# Patient Record
Sex: FEMALE | Race: WHITE | Employment: FULL TIME | URBAN - NONMETROPOLITAN AREA
[De-identification: names, ages, dates, MRNs, and addresses within clinical notes are randomized per-mention and may not be internally consistent; named-entity substitution may affect disease eponyms.]

---

## 2017-01-06 ENCOUNTER — TELEPHONE (OUTPATIENT)
Dept: FAMILY MEDICINE CLINIC | Facility: CLINIC | Age: 57
End: 2017-01-06

## 2017-01-06 NOTE — TELEPHONE ENCOUNTER
[id number]--  Left Message - schedule appt with Dr Lala Nava for EKG and BMP; is past due for CMP     By Africa Garner RN

## 2017-01-09 ENCOUNTER — OFFICE VISIT (OUTPATIENT)
Dept: FAMILY MEDICINE CLINIC | Facility: CLINIC | Age: 57
End: 2017-01-09

## 2017-01-09 VITALS
BODY MASS INDEX: 25 KG/M2 | TEMPERATURE: 97 F | DIASTOLIC BLOOD PRESSURE: 70 MMHG | WEIGHT: 162.5 LBS | SYSTOLIC BLOOD PRESSURE: 138 MMHG

## 2017-01-09 DIAGNOSIS — Z01.818 PRE-OP EVALUATION: Primary | ICD-10-CM

## 2017-01-09 DIAGNOSIS — E78.00 HIGH CHOLESTEROL: ICD-10-CM

## 2017-01-09 DIAGNOSIS — I10 ESSENTIAL HYPERTENSION: ICD-10-CM

## 2017-01-09 LAB
ALBUMIN SERPL-MCNC: 3.8 G/DL (ref 3.5–4.8)
ALP LIVER SERPL-CCNC: 74 U/L (ref 46–118)
ALT SERPL-CCNC: 21 U/L (ref 14–54)
AST SERPL-CCNC: 18 U/L (ref 15–41)
BILIRUB SERPL-MCNC: 0.5 MG/DL (ref 0.1–2)
BUN BLD-MCNC: 14 MG/DL (ref 8–20)
CALCIUM BLD-MCNC: 8.6 MG/DL (ref 8.3–10.3)
CHLORIDE: 105 MMOL/L (ref 101–111)
CHOLEST SMN-MCNC: 212 MG/DL (ref ?–200)
CO2: 25 MMOL/L (ref 22–32)
CREAT BLD-MCNC: 0.59 MG/DL (ref 0.55–1.02)
GLUCOSE BLD-MCNC: 104 MG/DL (ref 70–99)
HDLC SERPL-MCNC: 44 MG/DL (ref 45–?)
HDLC SERPL: 4.82 {RATIO} (ref ?–4.44)
LDLC SERPL CALC-MCNC: 144 MG/DL (ref ?–130)
M PROTEIN MFR SERPL ELPH: 7 G/DL (ref 6.1–8.3)
NONHDLC SERPL-MCNC: 168 MG/DL (ref ?–130)
POTASSIUM SERPL-SCNC: 3.5 MMOL/L (ref 3.6–5.1)
SODIUM SERPL-SCNC: 138 MMOL/L (ref 136–144)
TRIGLYCERIDES: 121 MG/DL (ref ?–150)
VLDL: 24 MG/DL (ref 5–40)

## 2017-01-09 PROCEDURE — 80053 COMPREHEN METABOLIC PANEL: CPT | Performed by: FAMILY MEDICINE

## 2017-01-09 PROCEDURE — 99214 OFFICE O/P EST MOD 30 MIN: CPT | Performed by: FAMILY MEDICINE

## 2017-01-09 PROCEDURE — 36415 COLL VENOUS BLD VENIPUNCTURE: CPT | Performed by: FAMILY MEDICINE

## 2017-01-09 PROCEDURE — 80061 LIPID PANEL: CPT | Performed by: FAMILY MEDICINE

## 2017-01-09 PROCEDURE — 93000 ELECTROCARDIOGRAM COMPLETE: CPT | Performed by: FAMILY MEDICINE

## 2017-01-09 NOTE — PROGRESS NOTES
Carlito Burrows is a 64year old female. Patient presents with:  HTN: re check on B/P... Slater August Slater August Slater August  8      HPI:   Pre-op  BP stable w/o problems w/ meds  + diet / wt loss     Current Outpatient Prescriptions on File Prior to Visit:  TURMERIC OR Take 1 capsule by no rashes,no suspicious lesions  HEENT: atraumatic, normocephalic, R TM normal, L TM normal, Pharynx normal  NECK: supple, no cervical adenopathy  LUNGS: clear to auscultation  CARDIO: RRR without murmur  ABD soft, nontender, normal BS, no masses, rebound

## 2017-01-13 ENCOUNTER — TELEPHONE (OUTPATIENT)
Dept: FAMILY MEDICINE CLINIC | Facility: CLINIC | Age: 57
End: 2017-01-13

## 2017-01-13 RX ORDER — LEVOTHYROXINE SODIUM 0.07 MG/1
TABLET ORAL
Qty: 90 TABLET | Refills: 0 | OUTPATIENT
Start: 2017-01-13

## 2017-01-13 NOTE — TELEPHONE ENCOUNTER
CPT: [id number], [id number]  DX: K40.90    I called [employer] and used the automated system. No auth required.  Conf # [id number]. Matthew Raymundo

## 2017-01-18 ENCOUNTER — OFFICE VISIT (OUTPATIENT)
Dept: FAMILY MEDICINE CLINIC | Facility: CLINIC | Age: 57
End: 2017-01-18

## 2017-01-18 VITALS
SYSTOLIC BLOOD PRESSURE: 126 MMHG | WEIGHT: 162 LBS | HEIGHT: 66 IN | DIASTOLIC BLOOD PRESSURE: 66 MMHG | TEMPERATURE: 98 F | BODY MASS INDEX: 26.03 KG/M2

## 2017-01-18 DIAGNOSIS — J00 ACUTE NASOPHARYNGITIS: Primary | ICD-10-CM

## 2017-01-18 PROCEDURE — 99213 OFFICE O/P EST LOW 20 MIN: CPT | Performed by: FAMILY MEDICINE

## 2017-01-18 NOTE — PATIENT INSTRUCTIONS
REST,FLUIDS,ADVIL / TYLENOL PRN fever / body aches  CALL NO CHANGE WORSENING  DISCUSSED WARNING SIGNS  F/U No change 2-3 days  Marysue Apley / Christin Fleming / Lorenz Aschoff

## 2017-01-18 NOTE — PROGRESS NOTES
HPI:    Patient ID: Celeste Choi is a 64year old female. X 1 day  + head sharmin  occas cough  W/o ST  HPI    Review of Systems           Current Outpatient Prescriptions:  TURMERIC OR Take 1 capsule by mouth daily.  Disp:  Rfl:    BYSTOLIC 10 MG Oral Tab

## 2017-01-19 ENCOUNTER — ANESTHESIA EVENT (OUTPATIENT)
Dept: SURGERY | Facility: HOSPITAL | Age: 57
End: 2017-01-19
Payer: COMMERCIAL

## 2017-01-20 ENCOUNTER — SURGERY (OUTPATIENT)
Age: 57
End: 2017-01-20

## 2017-01-20 ENCOUNTER — ANESTHESIA (OUTPATIENT)
Dept: SURGERY | Facility: HOSPITAL | Age: 57
End: 2017-01-20
Payer: COMMERCIAL

## 2017-01-20 NOTE — ANESTHESIA PREPROCEDURE EVALUATION
PRE-OP EVALUATION    Patient Name: Ilda Vaz    Pre-op Diagnosis: Inguinal hernia without mention of obstruction or gangrene, unilateral or unspecified, (not specified as recurrent) [K40.90]    Procedure(s):  LAPAROSCOPIC LEFT INGUINAL HERNIA REPAIR W Lab Results  Component Value Date    01/09/2017   K 3.5* 01/09/2017    01/09/2017   CO2 25.0 01/09/2017   BUN 14 01/09/2017   CREATSERUM 0.59 01/09/2017   * 01/09/2017   CA 8.6 01/09/2017            Airway      Mallampati: I  Mouth o

## 2017-01-20 NOTE — ANESTHESIA POSTPROCEDURE EVALUATION
[id number] [facility] Patient Status:  Hospital Outpatient Surgery   Age/Gender 64year old female MRN [id number]   Location [address] Attending Stephanie Essex, DO   Hosp Day # 0 PCP Abigail Valenzuela DO       Anesthes

## 2017-01-25 ENCOUNTER — TELEPHONE (OUTPATIENT)
Dept: FAMILY MEDICINE CLINIC | Facility: CLINIC | Age: 57
End: 2017-01-25

## 2017-01-25 RX ORDER — ONDANSETRON 4 MG/1
4 TABLET, ORALLY DISINTEGRATING ORAL EVERY 8 HOURS PRN
Qty: 8 TABLET | Refills: 0 | Status: SHIPPED | OUTPATIENT
Start: 2017-01-25 | End: 2017-01-27

## 2017-01-25 NOTE — TELEPHONE ENCOUNTER
Pt is 5 days s/p inguinal hernia repair. Pt states since surgery she has been nauseated and dizzy. Today she threw up x2. Pt denies fever or diarrhea. She stopped taking the Norco 2 days ago. She is using Ibuprofen 3 po every 4-6 hrs prn for pain.  Pt aware

## 2017-01-27 ENCOUNTER — OFFICE VISIT (OUTPATIENT)
Dept: FAMILY MEDICINE CLINIC | Facility: CLINIC | Age: 57
End: 2017-01-27

## 2017-01-27 VITALS — SYSTOLIC BLOOD PRESSURE: 110 MMHG | TEMPERATURE: 98 F | DIASTOLIC BLOOD PRESSURE: 70 MMHG | HEIGHT: 66 IN

## 2017-01-27 DIAGNOSIS — K40.90 UNILATERAL INGUINAL HERNIA WITHOUT OBSTRUCTION OR GANGRENE, RECURRENCE NOT SPECIFIED: Primary | ICD-10-CM

## 2017-01-27 DIAGNOSIS — I10 ESSENTIAL HYPERTENSION: Primary | ICD-10-CM

## 2017-01-27 PROCEDURE — 99024 POSTOP FOLLOW-UP VISIT: CPT | Performed by: SURGERY

## 2017-01-27 RX ORDER — NEBIVOLOL HYDROCHLORIDE 10 MG/1
TABLET ORAL
Qty: 30 TABLET | Refills: 2 | Status: SHIPPED | OUTPATIENT
Start: 2017-01-27 | End: 2017-04-20

## 2017-01-27 NOTE — PROGRESS NOTES
1/27/2017    Patient presents with:  Post-Op: s/p LT inguinal hernia repair on 1/20/17. ....denies pain and discharge from incision sites. ..no fevers, but stomach has been bothering pt--nausea. ...room [address] [facility] Terrell Sabina is status post a laparoscopic repa

## 2017-02-08 ENCOUNTER — TELEPHONE (OUTPATIENT)
Dept: FAMILY MEDICINE CLINIC | Facility: CLINIC | Age: 57
End: 2017-02-08

## 2017-02-08 NOTE — TELEPHONE ENCOUNTER
Pt was planning on going back to work on 2/27/17. Pt states her 6 weeks would be up on 3/3/17 and would need a note with the restrictions-no lifting, pulling or pushing greater than 5 lbs and no standing for prolonged time until 3/3/17.  faxed to her HR dep

## 2017-02-14 ENCOUNTER — TELEPHONE (OUTPATIENT)
Dept: FAMILY MEDICINE CLINIC | Facility: CLINIC | Age: 57
End: 2017-02-14

## 2017-02-14 NOTE — TELEPHONE ENCOUNTER
Pt calls in to let me know she will RTW on 2/27/16. Pt will needs restrictions x1 week. Pt aware I will fax note to [phone]. Note faxed.  marta

## 2017-02-24 RX ORDER — LISINOPRIL AND HYDROCHLOROTHIAZIDE 12.5; 1 MG/1; MG/1
TABLET ORAL
Qty: 90 TABLET | Refills: 0 | Status: SHIPPED | OUTPATIENT
Start: 2017-02-24 | End: 2017-10-17 | Stop reason: ALTCHOICE

## 2017-04-20 RX ORDER — NEBIVOLOL HYDROCHLORIDE 10 MG/1
TABLET ORAL
Qty: 30 TABLET | Refills: 0 | Status: SHIPPED | OUTPATIENT
Start: 2017-04-20 | End: 2017-05-19

## 2017-05-19 ENCOUNTER — TELEPHONE (OUTPATIENT)
Dept: FAMILY MEDICINE CLINIC | Facility: CLINIC | Age: 57
End: 2017-05-19

## 2017-05-19 RX ORDER — NEBIVOLOL HYDROCHLORIDE 10 MG/1
TABLET ORAL
Qty: 30 TABLET | Refills: 0 | Status: SHIPPED | OUTPATIENT
Start: 2017-05-19 | End: 2017-06-16

## 2017-05-19 RX ORDER — LISINOPRIL AND HYDROCHLOROTHIAZIDE 12.5; 1 MG/1; MG/1
TABLET ORAL
Qty: 90 TABLET | Refills: 0 | Status: SHIPPED | OUTPATIENT
Start: 2017-05-19 | End: 2017-10-17 | Stop reason: ALTCHOICE

## 2017-05-19 NOTE — TELEPHONE ENCOUNTER
Last OV: 1/18/2017  Last labs: 4/6/5703  Bystolic: 8/37/2023 #26 no RF  Lisinopril hctz: 2/24/2017 #90 no RF

## 2017-05-19 NOTE — TELEPHONE ENCOUNTER
pt has allergies and been taking allegra d but still coughing all night long, she does not have ins until july, wants to talk with the nurse. Since Sunday started out sneezing, PND, Head congestion; has been taking allegraD.   Last night coughing kept he

## 2017-05-22 ENCOUNTER — OFFICE VISIT (OUTPATIENT)
Dept: FAMILY MEDICINE CLINIC | Facility: CLINIC | Age: 57
End: 2017-05-22

## 2017-05-22 VITALS
DIASTOLIC BLOOD PRESSURE: 84 MMHG | HEIGHT: 66 IN | WEIGHT: 161 LBS | BODY MASS INDEX: 25.88 KG/M2 | TEMPERATURE: 98 F | SYSTOLIC BLOOD PRESSURE: 116 MMHG

## 2017-05-22 DIAGNOSIS — J45.21 REACTIVE AIRWAY DISEASE, MILD INTERMITTENT, WITH ACUTE EXACERBATION: ICD-10-CM

## 2017-05-22 DIAGNOSIS — J40 BRONCHITIS: Primary | ICD-10-CM

## 2017-05-22 PROCEDURE — 99213 OFFICE O/P EST LOW 20 MIN: CPT | Performed by: FAMILY MEDICINE

## 2017-05-22 RX ORDER — AZITHROMYCIN 250 MG/1
TABLET, FILM COATED ORAL
Qty: 6 TABLET | Refills: 0 | Status: SHIPPED | OUTPATIENT
Start: 2017-05-22 | End: 2017-10-17 | Stop reason: ALTCHOICE

## 2017-05-22 RX ORDER — PREDNISONE 20 MG/1
20 TABLET ORAL 2 TIMES DAILY
Qty: 10 TABLET | Refills: 0 | Status: SHIPPED | OUTPATIENT
Start: 2017-05-22 | End: 2017-05-29

## 2017-05-22 NOTE — PROGRESS NOTES
HPI:    Patient ID: Vinay Harris is a 62year old female. X 1 wk  Cough  Head sharmin  HPI    Review of Systems   Constitutional: Positive for chills and fatigue. Negative for fever. HENT: Positive for congestion and rhinorrhea.     Respiratory: Positive exhibits no edema. Vitals reviewed.     /84 mmHg  Temp(Src) 97.8 °F (36.6 °C) (Temporal)  Ht 66\"  Wt 161 lb  BMI 26.00 kg/m2           ASSESSMENT/PLAN:   Bronchitis  (primary encounter diagnosis)  Reactive airway disease, mild intermittent, with ac

## 2017-06-16 RX ORDER — NEBIVOLOL HYDROCHLORIDE 10 MG/1
TABLET ORAL
Qty: 30 TABLET | Refills: 1 | Status: SHIPPED | OUTPATIENT
Start: 2017-06-16 | End: 2017-08-13

## 2017-07-20 ENCOUNTER — TELEPHONE (OUTPATIENT)
Dept: FAMILY MEDICINE CLINIC | Facility: CLINIC | Age: 57
End: 2017-07-20

## 2017-07-20 DIAGNOSIS — I83.93 VARICOSE VEINS OF LEGS: Primary | ICD-10-CM

## 2017-07-20 NOTE — TELEPHONE ENCOUNTER
Pt has bc hmo ins, she needs a referral sent to Dr Norris July for her veins, she is faxing a copy of her ins card.

## 2017-07-20 NOTE — TELEPHONE ENCOUNTER
Pt states she is currently in treatment for bilat varicose veins with [address] [place] [address] and she needs a referral to see Dr. Guerrero Talley.  Advised that Dr. Guerrero Talley is not in the the [facility] network for being treatment and pt would need to see a physician within

## 2017-08-14 RX ORDER — LISINOPRIL AND HYDROCHLOROTHIAZIDE 12.5; 1 MG/1; MG/1
TABLET ORAL
Qty: 90 TABLET | Refills: 0 | Status: SHIPPED | OUTPATIENT
Start: 2017-08-14 | End: 2018-07-26

## 2017-08-14 RX ORDER — NEBIVOLOL HYDROCHLORIDE 10 MG/1
TABLET ORAL
Qty: 30 TABLET | Refills: 0 | Status: SHIPPED | OUTPATIENT
Start: 2017-08-14 | End: 2017-09-10

## 2017-09-11 RX ORDER — NEBIVOLOL HYDROCHLORIDE 10 MG/1
TABLET ORAL
Qty: 30 TABLET | Refills: 0 | Status: SHIPPED | OUTPATIENT
Start: 2017-09-11 | End: 2017-10-30

## 2017-09-11 NOTE — TELEPHONE ENCOUNTER
Last ov:  5/22/17  Last cmp: 1/9/17   Last bp:  5/22/17    116/84   Last refill:  8/14/17   #30 with no refills      No future appointments.

## 2017-09-19 NOTE — PATIENT INSTRUCTIONS
REST,FLUIDS,ADVIL / TYLENOL PRN fever / body aches  CALL NO CHANGE WORSENING  DISCUSSED WARNING SIGNS  F/U No change 2-3 days
No significant past surgical history

## 2017-10-01 DIAGNOSIS — E03.9 HYPOTHYROIDISM, UNSPECIFIED TYPE: ICD-10-CM

## 2017-10-02 RX ORDER — LEVOTHYROXINE SODIUM 0.07 MG/1
TABLET ORAL
Qty: 90 TABLET | Refills: 0 | Status: SHIPPED | OUTPATIENT
Start: 2017-10-02 | End: 2017-10-20

## 2017-10-02 NOTE — TELEPHONE ENCOUNTER
Last office visit 5-22-17  Last refill 11-21-16 #90 with 2 refills. Last TSH 10-13-16. Due for TSH. Patient advised. Nurse appointment scheduled.   Future Appointments  Date Time Provider James Mejia   10/16/2017 4:00 PM [facility] NURSE JERRY DALEY

## 2017-10-14 ENCOUNTER — HOSPITAL ENCOUNTER (OUTPATIENT)
Dept: MAMMOGRAPHY | Age: 57
Discharge: HOME | End: 2017-10-14
Attending: OBSTETRICS & GYNECOLOGY
Payer: COMMERCIAL

## 2017-10-14 DIAGNOSIS — Z12.39 ENCOUNTER FOR SPECIAL SCREENING EXAMINATION FOR NEOPLASM OF BREAST: ICD-10-CM

## 2017-10-14 PROCEDURE — 77067 SCR MAMMO BI INCL CAD: CPT | Performed by: OBSTETRICS & GYNECOLOGY

## 2017-10-17 ENCOUNTER — OFFICE VISIT (OUTPATIENT)
Dept: FAMILY MEDICINE CLINIC | Facility: CLINIC | Age: 57
End: 2017-10-17

## 2017-10-17 ENCOUNTER — APPOINTMENT (OUTPATIENT)
Dept: LAB | Age: 57
End: 2017-10-17
Attending: FAMILY MEDICINE
Payer: COMMERCIAL

## 2017-10-17 VITALS
HEIGHT: 66 IN | OXYGEN SATURATION: 99 % | TEMPERATURE: 98 F | HEART RATE: 61 BPM | SYSTOLIC BLOOD PRESSURE: 120 MMHG | WEIGHT: 163.5 LBS | DIASTOLIC BLOOD PRESSURE: 80 MMHG | BODY MASS INDEX: 26.28 KG/M2

## 2017-10-17 DIAGNOSIS — E03.9 HYPOTHYROIDISM, UNSPECIFIED TYPE: ICD-10-CM

## 2017-10-17 DIAGNOSIS — H92.01 RIGHT EAR PAIN: Primary | ICD-10-CM

## 2017-10-17 DIAGNOSIS — M26.629 TMJ SYNDROME: ICD-10-CM

## 2017-10-17 DIAGNOSIS — I10 ESSENTIAL HYPERTENSION: ICD-10-CM

## 2017-10-17 PROCEDURE — 99213 OFFICE O/P EST LOW 20 MIN: CPT | Performed by: FAMILY MEDICINE

## 2017-10-17 PROCEDURE — 36415 COLL VENOUS BLD VENIPUNCTURE: CPT | Performed by: FAMILY MEDICINE

## 2017-10-17 PROCEDURE — 84443 ASSAY THYROID STIM HORMONE: CPT | Performed by: FAMILY MEDICINE

## 2017-10-17 RX ORDER — CYCLOBENZAPRINE HCL 10 MG
10 TABLET ORAL NIGHTLY
Qty: 30 TABLET | Refills: 0 | Status: SHIPPED | OUTPATIENT
Start: 2017-10-17 | End: 2017-11-06

## 2017-10-17 NOTE — PROGRESS NOTES
HPI:    Patient ID: Socorro De Jesus is a 62year old female. R ear pain / throbbing  HPI    Review of Systems   Constitutional: Positive for chills and fever. HENT: Positive for ear pain, rhinorrhea and sneezing. Respiratory: Negative for cough.

## 2017-10-20 DIAGNOSIS — E03.9 HYPOTHYROIDISM, UNSPECIFIED TYPE: ICD-10-CM

## 2017-10-20 RX ORDER — LEVOTHYROXINE SODIUM 0.07 MG/1
TABLET ORAL
Qty: 90 TABLET | Refills: 3 | Status: SHIPPED | OUTPATIENT
Start: 2017-10-20 | End: 2018-08-05

## 2017-10-30 RX ORDER — NEBIVOLOL HYDROCHLORIDE 10 MG/1
TABLET ORAL
Qty: 30 TABLET | Refills: 2 | Status: SHIPPED | OUTPATIENT
Start: 2017-10-30 | End: 2018-01-30

## 2017-10-30 RX ORDER — LISINOPRIL AND HYDROCHLOROTHIAZIDE 12.5; 1 MG/1; MG/1
TABLET ORAL
Qty: 90 TABLET | Refills: 0 | Status: SHIPPED | OUTPATIENT
Start: 2017-10-30 | End: 2017-11-29

## 2017-11-29 ENCOUNTER — OFFICE VISIT (OUTPATIENT)
Dept: FAMILY MEDICINE CLINIC | Facility: CLINIC | Age: 57
End: 2017-11-29

## 2017-11-29 ENCOUNTER — TELEPHONE (OUTPATIENT)
Dept: FAMILY MEDICINE CLINIC | Facility: CLINIC | Age: 57
End: 2017-11-29

## 2017-11-29 VITALS
HEART RATE: 72 BPM | SYSTOLIC BLOOD PRESSURE: 142 MMHG | OXYGEN SATURATION: 98 % | TEMPERATURE: 98 F | WEIGHT: 163.13 LBS | DIASTOLIC BLOOD PRESSURE: 90 MMHG | BODY MASS INDEX: 26 KG/M2

## 2017-11-29 DIAGNOSIS — B34.9 VIRAL SYNDROME: Primary | ICD-10-CM

## 2017-11-29 PROCEDURE — 99213 OFFICE O/P EST LOW 20 MIN: CPT | Performed by: FAMILY MEDICINE

## 2017-11-29 RX ORDER — PREDNISONE 20 MG/1
TABLET ORAL
Qty: 10 TABLET | Refills: 0 | Status: SHIPPED | OUTPATIENT
Start: 2017-11-29 | End: 2017-12-21 | Stop reason: ALTCHOICE

## 2017-11-29 RX ORDER — CODEINE PHOSPHATE AND GUAIFENESIN 10; 100 MG/5ML; MG/5ML
SOLUTION ORAL
Qty: 120 ML | Refills: 0 | Status: SHIPPED | OUTPATIENT
Start: 2017-11-29 | End: 2017-12-21 | Stop reason: ALTCHOICE

## 2017-11-29 NOTE — PROGRESS NOTES
Margareth Webb is a 62year old female. Patient presents with: Other: cough, congestion, headache-started on 11/26--has been taking dayquil and nyquil, mucinex. .room 1      HPI:   Patient complains of congestion, runny nose, sore throat, slight headache, REVIEW OF SYSTEMS:   GENERAL HEALTH: feels well otherwise  SKIN: denies any unusual skin lesions or rashes  RESPIRATORY: denies shortness of breath   CARDIOVASCULAR: denies chest pain   GI: denies nausea, vomiting, diarrhea or abdominal pain   NEURO: d

## 2017-11-29 NOTE — TELEPHONE ENCOUNTER
Pt states for the last 3-4 days she has had nasal congestion with a cough. She states the cough became worse last night. She states she has had fevers on and off.  Pt aware per Dr Rocha Gone she can see him tomorrow or make an appt with one of the other 's fo

## 2017-12-20 ENCOUNTER — TELEPHONE (OUTPATIENT)
Dept: FAMILY MEDICINE CLINIC | Facility: CLINIC | Age: 57
End: 2017-12-20

## 2017-12-20 NOTE — TELEPHONE ENCOUNTER
States, \"It came back or never went away. \"  Has been feeling worse again since Sunday. Symptoms started Saturday with generalized achiness and sore throat. Taking Mucinex and Sudafed PE. Cough, shortness of breath, sneezing, head congestion, headache.

## 2017-12-20 NOTE — TELEPHONE ENCOUNTER
See tomorrow. V/o Dr. Shara Raza. Patient advised. Appointment scheduled.   Future Appointments  Date Time Provider James Mejia   12/21/2017 11:30 AM Nina Kiser, DO [facility] [place]

## 2017-12-21 ENCOUNTER — OFFICE VISIT (OUTPATIENT)
Dept: FAMILY MEDICINE CLINIC | Facility: CLINIC | Age: 57
End: 2017-12-21

## 2017-12-21 VITALS
TEMPERATURE: 98 F | WEIGHT: 165.38 LBS | SYSTOLIC BLOOD PRESSURE: 110 MMHG | BODY MASS INDEX: 27 KG/M2 | DIASTOLIC BLOOD PRESSURE: 70 MMHG | HEART RATE: 58 BPM | OXYGEN SATURATION: 98 %

## 2017-12-21 DIAGNOSIS — J32.9 SINUSITIS, UNSPECIFIED CHRONICITY, UNSPECIFIED LOCATION: Primary | ICD-10-CM

## 2017-12-21 PROCEDURE — 99213 OFFICE O/P EST LOW 20 MIN: CPT | Performed by: FAMILY MEDICINE

## 2017-12-21 RX ORDER — PREDNISONE 20 MG/1
20 TABLET ORAL 2 TIMES DAILY
Qty: 10 TABLET | Refills: 0 | Status: SHIPPED | OUTPATIENT
Start: 2017-12-21 | End: 2017-12-28

## 2017-12-21 RX ORDER — DOXYCYCLINE 100 MG/1
100 CAPSULE ORAL 2 TIMES DAILY
Qty: 20 CAPSULE | Refills: 0 | Status: SHIPPED | OUTPATIENT
Start: 2017-12-21 | End: 2018-09-12 | Stop reason: ALTCHOICE

## 2017-12-21 NOTE — PROGRESS NOTES
HPI:    Patient ID: Gabriel Miner is a 62year old female. Cough / head sharmin x 6 days    HPI    Review of Systems   Constitutional: Positive for fever (? feverish). Gastrointestinal: Negative. Genitourinary: Negative.                Current Outpatien Visit:  Signed Prescriptions Disp Refills    predniSONE 20 MG Oral Tab 10 tablet 0      Sig: Take 1 tablet (20 mg total) by mouth 2 (two) times daily.       Doxycycline Monohydrate 100 MG Oral Cap 20 capsule 0      Sig: Take 1 capsule (100 mg total) by mout

## 2018-01-30 RX ORDER — LISINOPRIL AND HYDROCHLOROTHIAZIDE 12.5; 1 MG/1; MG/1
TABLET ORAL
Qty: 90 TABLET | Refills: 0 | Status: SHIPPED | OUTPATIENT
Start: 2018-01-30 | End: 2018-07-26

## 2018-01-30 RX ORDER — NEBIVOLOL HYDROCHLORIDE 10 MG/1
TABLET ORAL
Qty: 30 TABLET | Refills: 2 | Status: SHIPPED | OUTPATIENT
Start: 2018-01-30 | End: 2018-04-28

## 2018-04-28 RX ORDER — NEBIVOLOL HYDROCHLORIDE 10 MG/1
TABLET ORAL
Qty: 30 TABLET | Refills: 0 | Status: SHIPPED | OUTPATIENT
Start: 2018-04-28 | End: 2018-05-27

## 2018-04-28 RX ORDER — LISINOPRIL AND HYDROCHLOROTHIAZIDE 12.5; 1 MG/1; MG/1
TABLET ORAL
Qty: 90 TABLET | Refills: 0 | Status: SHIPPED | OUTPATIENT
Start: 2018-04-28 | End: 2019-03-04

## 2018-04-28 NOTE — TELEPHONE ENCOUNTER
Bystolic   Lisinopril-HCTZ    Last script 1/30/18  Last BP 12/21/17  Last CMP 1/9/17  Last OV 12/21/17    No future appointments.   Sending letter

## 2018-05-29 RX ORDER — NEBIVOLOL HYDROCHLORIDE 10 MG/1
TABLET ORAL
Qty: 30 TABLET | Refills: 0 | Status: SHIPPED | OUTPATIENT
Start: 2018-05-29 | End: 2018-06-28

## 2018-06-08 ENCOUNTER — TELEPHONE (OUTPATIENT)
Dept: FAMILY MEDICINE CLINIC | Facility: CLINIC | Age: 58
End: 2018-06-08

## 2018-06-08 ENCOUNTER — NURSE ONLY (OUTPATIENT)
Dept: FAMILY MEDICINE CLINIC | Facility: CLINIC | Age: 58
End: 2018-06-08

## 2018-06-08 DIAGNOSIS — Z23 NEED FOR VACCINATION: Primary | ICD-10-CM

## 2018-06-08 PROCEDURE — 90715 TDAP VACCINE 7 YRS/> IM: CPT | Performed by: FAMILY MEDICINE

## 2018-06-08 PROCEDURE — 90471 IMMUNIZATION ADMIN: CPT | Performed by: FAMILY MEDICINE

## 2018-06-08 NOTE — TELEPHONE ENCOUNTER
Monae's grandson has whooping cough, he is 3 mth's old and in the hospital, she would like to go see him but not not sure if she needs to take any precautions, call Daina Flores.

## 2018-06-08 NOTE — PROGRESS NOTES
PT VERY CONCERNED OVER GRANDSON IN [place] DIAGNOSED WITH WHOOPING COUGH. Tdap GIVEN LEFT DELTOID. ASKS FOR HANDOUT ON PERTUSSIS, GAVE HER INFO FROM [employer] SITE. DR Moreno Amezcua SPOKE TO HER BRIEFLY AS LEAVING OFFICE.

## 2018-06-29 RX ORDER — NEBIVOLOL HYDROCHLORIDE 10 MG/1
TABLET ORAL
Qty: 30 TABLET | Refills: 0 | Status: SHIPPED | OUTPATIENT
Start: 2018-06-29 | End: 2018-07-26

## 2018-07-27 RX ORDER — LISINOPRIL AND HYDROCHLOROTHIAZIDE 12.5; 1 MG/1; MG/1
TABLET ORAL
Qty: 90 TABLET | Refills: 0 | Status: SHIPPED | OUTPATIENT
Start: 2018-07-27 | End: 2018-09-12

## 2018-07-27 RX ORDER — NEBIVOLOL HYDROCHLORIDE 10 MG/1
TABLET ORAL
Qty: 30 TABLET | Refills: 0 | Status: SHIPPED | OUTPATIENT
Start: 2018-07-27 | End: 2018-09-01

## 2018-08-05 DIAGNOSIS — E03.9 HYPOTHYROIDISM, UNSPECIFIED TYPE: ICD-10-CM

## 2018-08-07 RX ORDER — LEVOTHYROXINE SODIUM 0.07 MG/1
TABLET ORAL
Qty: 90 TABLET | Refills: 0 | Status: SHIPPED | OUTPATIENT
Start: 2018-08-07 | End: 2019-03-23

## 2018-09-04 RX ORDER — NEBIVOLOL HYDROCHLORIDE 10 MG/1
TABLET ORAL
Qty: 30 TABLET | Refills: 0 | Status: SHIPPED | OUTPATIENT
Start: 2018-09-04 | End: 2018-09-29

## 2018-09-04 NOTE — TELEPHONE ENCOUNTER
Last office visit 12-21-17 110/70  Last refill 7-27-18 #30  Future Appointments  Date Time Provider James Mejia   9/12/2018 8:15 AM Rosendo Ritter DO [facility] [place]   9/12/2018 8:30 AM REF [facility] REF [facility] Ref Lab [facility]

## 2018-09-12 ENCOUNTER — APPOINTMENT (OUTPATIENT)
Dept: LAB | Age: 58
End: 2018-09-12
Attending: FAMILY MEDICINE
Payer: COMMERCIAL

## 2018-09-12 ENCOUNTER — OFFICE VISIT (OUTPATIENT)
Dept: FAMILY MEDICINE CLINIC | Facility: CLINIC | Age: 58
End: 2018-09-12
Payer: COMMERCIAL

## 2018-09-12 VITALS
HEIGHT: 66 IN | TEMPERATURE: 97 F | DIASTOLIC BLOOD PRESSURE: 68 MMHG | BODY MASS INDEX: 26.52 KG/M2 | SYSTOLIC BLOOD PRESSURE: 112 MMHG | WEIGHT: 165 LBS | HEART RATE: 60 BPM | RESPIRATION RATE: 12 BRPM

## 2018-09-12 DIAGNOSIS — I10 ESSENTIAL HYPERTENSION: Primary | ICD-10-CM

## 2018-09-12 DIAGNOSIS — I10 ESSENTIAL HYPERTENSION: ICD-10-CM

## 2018-09-12 DIAGNOSIS — E78.00 HYPERCHOLESTEROLEMIA: ICD-10-CM

## 2018-09-12 DIAGNOSIS — E03.9 HYPOTHYROIDISM, UNSPECIFIED TYPE: ICD-10-CM

## 2018-09-12 LAB
ALBUMIN SERPL-MCNC: 3.8 G/DL (ref 3.5–4.8)
ALBUMIN/GLOB SERPL: 1.1 {RATIO} (ref 1–2)
ALP LIVER SERPL-CCNC: 78 U/L (ref 46–118)
ALT SERPL-CCNC: 24 U/L (ref 14–54)
ANION GAP SERPL CALC-SCNC: 7 MMOL/L (ref 0–18)
AST SERPL-CCNC: 22 U/L (ref 15–41)
BILIRUB SERPL-MCNC: 0.3 MG/DL (ref 0.1–2)
BUN BLD-MCNC: 14 MG/DL (ref 8–20)
BUN/CREAT SERPL: 20.6 (ref 10–20)
CALCIUM BLD-MCNC: 9.4 MG/DL (ref 8.3–10.3)
CHLORIDE SERPL-SCNC: 103 MMOL/L (ref 101–111)
CHOLEST SMN-MCNC: 253 MG/DL (ref ?–200)
CO2 SERPL-SCNC: 28 MMOL/L (ref 22–32)
CREAT BLD-MCNC: 0.68 MG/DL (ref 0.55–1.02)
GLOBULIN PLAS-MCNC: 3.6 G/DL (ref 2.5–4)
GLUCOSE BLD-MCNC: 96 MG/DL (ref 70–99)
HDLC SERPL-MCNC: 42 MG/DL (ref 40–59)
LDLC SERPL CALC-MCNC: 167 MG/DL (ref ?–100)
M PROTEIN MFR SERPL ELPH: 7.4 G/DL (ref 6.1–8.3)
NONHDLC SERPL-MCNC: 211 MG/DL (ref ?–130)
OSMOLALITY SERPL CALC.SUM OF ELEC: 286 MOSM/KG (ref 275–295)
POTASSIUM SERPL-SCNC: 4.1 MMOL/L (ref 3.6–5.1)
SODIUM SERPL-SCNC: 138 MMOL/L (ref 136–144)
TRIGL SERPL-MCNC: 222 MG/DL (ref 30–149)
TSI SER-ACNC: 6.22 MIU/ML (ref 0.35–5.5)
VIT D+METAB SERPL-MCNC: 22 NG/ML (ref 30–100)
VLDLC SERPL CALC-MCNC: 44 MG/DL (ref 0–30)

## 2018-09-12 PROCEDURE — 36415 COLL VENOUS BLD VENIPUNCTURE: CPT | Performed by: FAMILY MEDICINE

## 2018-09-12 PROCEDURE — 80061 LIPID PANEL: CPT | Performed by: FAMILY MEDICINE

## 2018-09-12 PROCEDURE — 80053 COMPREHEN METABOLIC PANEL: CPT | Performed by: FAMILY MEDICINE

## 2018-09-12 PROCEDURE — 82306 VITAMIN D 25 HYDROXY: CPT | Performed by: FAMILY MEDICINE

## 2018-09-12 PROCEDURE — 84443 ASSAY THYROID STIM HORMONE: CPT | Performed by: FAMILY MEDICINE

## 2018-09-12 PROCEDURE — 99214 OFFICE O/P EST MOD 30 MIN: CPT | Performed by: FAMILY MEDICINE

## 2018-09-12 RX ORDER — ACETAMINOPHEN 160 MG
5000 TABLET,DISINTEGRATING ORAL DAILY
COMMUNITY
End: 2019-03-27 | Stop reason: DRUGHIGH

## 2018-09-12 NOTE — PATIENT INSTRUCTIONS
Monitor BP  Reg exercise  Wt loss  Continue  Medication,   Diet and weight management  Monitor blood pressure  Follow up with questions or problems

## 2018-09-12 NOTE — PROGRESS NOTES
HPI:    Patient ID: Joey Pelletier is a 62year old female. BP stable  Breathing OK  W/o problems w/ meds  HPI    Review of Systems   Respiratory: Negative for cough and shortness of breath.     Cardiovascular: Negative for chest pain, palpitations and leg (primary encounter diagnosis)  Hypothyroidism, unspecified type  Hypercholesterolemia    Orders Placed This Encounter      TSH      Comp Metabolic Panel (14) [E]      Lipid Panel [E]      Vitamin D, 25-Hydroxy [E]      Meds This Visit:  Requested Prescript

## 2018-09-13 DIAGNOSIS — E03.9 HYPOTHYROIDISM, UNSPECIFIED TYPE: Primary | ICD-10-CM

## 2018-09-13 DIAGNOSIS — I10 ESSENTIAL HYPERTENSION: ICD-10-CM

## 2018-09-13 DIAGNOSIS — E55.9 VITAMIN D DEFICIENCY: ICD-10-CM

## 2018-09-13 DIAGNOSIS — E78.00 HYPERCHOLESTEROLEMIA: ICD-10-CM

## 2018-09-13 RX ORDER — MAG HYDROX/ALUMINUM HYD/SIMETH 400-400-40
1 SUSPENSION, ORAL (FINAL DOSE FORM) ORAL DAILY
Qty: 1 CAPSULE | Refills: 0 | COMMUNITY
Start: 2018-09-13

## 2018-09-13 RX ORDER — LEVOTHYROXINE SODIUM 88 UG/1
88 TABLET ORAL
Qty: 90 TABLET | Refills: 1 | Status: SHIPPED | OUTPATIENT
Start: 2018-09-13 | End: 2019-03-04

## 2018-09-29 RX ORDER — NEBIVOLOL HYDROCHLORIDE 10 MG/1
TABLET ORAL
Qty: 90 TABLET | Refills: 0 | Status: SHIPPED | OUTPATIENT
Start: 2018-09-29 | End: 2018-12-27

## 2018-11-01 RX ORDER — LISINOPRIL AND HYDROCHLOROTHIAZIDE 12.5; 1 MG/1; MG/1
TABLET ORAL
Qty: 90 TABLET | Refills: 0 | Status: SHIPPED | OUTPATIENT
Start: 2018-11-01 | End: 2019-03-04

## 2018-11-01 NOTE — TELEPHONE ENCOUNTER
Last ov: 09/12/2018    Last refill: 07/27/2018    Last B/P reading was 112/68 on 09/12/2018  Last CMP 09/12/2018    No future appointments.     Requested Prescriptions     Pending Prescriptions Disp Refills   • LISINOPRIL-HYDROCHLOROTHIAZIDE 10-12.5 MG Oral

## 2018-11-12 ENCOUNTER — TELEPHONE (OUTPATIENT)
Dept: FAMILY MEDICINE CLINIC | Facility: CLINIC | Age: 58
End: 2018-11-12

## 2018-11-12 ENCOUNTER — NURSE ONLY (OUTPATIENT)
Dept: FAMILY MEDICINE CLINIC | Facility: CLINIC | Age: 58
End: 2018-11-12
Payer: COMMERCIAL

## 2018-11-12 DIAGNOSIS — R35.0 FREQUENCY OF URINATION: ICD-10-CM

## 2018-11-12 DIAGNOSIS — R39.15 URGENCY OF URINATION: ICD-10-CM

## 2018-11-12 DIAGNOSIS — R30.0 DYSURIA: Primary | ICD-10-CM

## 2018-11-12 PROCEDURE — 81003 URINALYSIS AUTO W/O SCOPE: CPT | Performed by: FAMILY MEDICINE

## 2018-11-12 PROCEDURE — 87086 URINE CULTURE/COLONY COUNT: CPT | Performed by: FAMILY MEDICINE

## 2018-11-12 RX ORDER — NITROFURANTOIN 25; 75 MG/1; MG/1
100 CAPSULE ORAL 2 TIMES DAILY
Qty: 14 CAPSULE | Refills: 0 | Status: SHIPPED | OUTPATIENT
Start: 2018-11-12 | End: 2019-03-23 | Stop reason: ALTCHOICE

## 2018-11-12 NOTE — TELEPHONE ENCOUNTER
This past Thursday had some pressure with urination but then let up until early this morning. Experiencing discomfort, urgency, and frequency. Nurse appt made to give urine specimen, and then Dr Abdi Walker to address.   catie/daphnie

## 2018-12-03 ENCOUNTER — APPOINTMENT (OUTPATIENT)
Dept: LAB | Age: 58
End: 2018-12-03
Attending: FAMILY MEDICINE
Payer: COMMERCIAL

## 2018-12-03 DIAGNOSIS — E78.00 HYPERCHOLESTEROLEMIA: ICD-10-CM

## 2018-12-03 DIAGNOSIS — E03.9 HYPOTHYROIDISM, UNSPECIFIED TYPE: ICD-10-CM

## 2018-12-03 DIAGNOSIS — I10 ESSENTIAL HYPERTENSION: ICD-10-CM

## 2018-12-03 PROCEDURE — 80053 COMPREHEN METABOLIC PANEL: CPT | Performed by: FAMILY MEDICINE

## 2018-12-03 PROCEDURE — 36415 COLL VENOUS BLD VENIPUNCTURE: CPT | Performed by: FAMILY MEDICINE

## 2018-12-03 PROCEDURE — 84443 ASSAY THYROID STIM HORMONE: CPT | Performed by: FAMILY MEDICINE

## 2018-12-03 PROCEDURE — 80061 LIPID PANEL: CPT | Performed by: FAMILY MEDICINE

## 2018-12-10 ENCOUNTER — TELEPHONE (OUTPATIENT)
Dept: FAMILY MEDICINE CLINIC | Facility: CLINIC | Age: 58
End: 2018-12-10

## 2018-12-10 NOTE — TELEPHONE ENCOUNTER
SHE SAID THAT SHE WAS TOLD THAT HER CHOLESTEROL WAS DOWN BUT THERE ARE NO RESULTS ABOUT THAT ON HER MY CHART

## 2018-12-15 ENCOUNTER — TELEPHONE (OUTPATIENT)
Dept: FAMILY MEDICINE CLINIC | Facility: CLINIC | Age: 58
End: 2018-12-15

## 2018-12-15 ENCOUNTER — NURSE ONLY (OUTPATIENT)
Dept: FAMILY MEDICINE CLINIC | Facility: CLINIC | Age: 58
End: 2018-12-15
Payer: COMMERCIAL

## 2018-12-15 DIAGNOSIS — R30.0 DYSURIA: Primary | ICD-10-CM

## 2018-12-15 PROCEDURE — 87077 CULTURE AEROBIC IDENTIFY: CPT | Performed by: FAMILY MEDICINE

## 2018-12-15 PROCEDURE — 87086 URINE CULTURE/COLONY COUNT: CPT | Performed by: FAMILY MEDICINE

## 2018-12-15 PROCEDURE — 87186 SC STD MICRODIL/AGAR DIL: CPT | Performed by: FAMILY MEDICINE

## 2018-12-15 PROCEDURE — 81003 URINALYSIS AUTO W/O SCOPE: CPT | Performed by: FAMILY MEDICINE

## 2018-12-15 RX ORDER — CIPROFLOXACIN 500 MG/1
500 TABLET, FILM COATED ORAL 2 TIMES DAILY
Qty: 14 TABLET | Refills: 0 | Status: SHIPPED | OUTPATIENT
Start: 2018-12-15 | End: 2018-12-22

## 2018-12-15 NOTE — TELEPHONE ENCOUNTER
Possible bladder infection- has been having symptoms for a few days but now there is blood in her urine

## 2018-12-27 DIAGNOSIS — I10 ESSENTIAL HYPERTENSION: Primary | ICD-10-CM

## 2018-12-27 RX ORDER — NEBIVOLOL HYDROCHLORIDE 10 MG/1
TABLET ORAL
Qty: 90 TABLET | Refills: 1 | Status: SHIPPED | OUTPATIENT
Start: 2018-12-27 | End: 2019-03-04

## 2019-01-28 RX ORDER — LISINOPRIL AND HYDROCHLOROTHIAZIDE 12.5; 1 MG/1; MG/1
TABLET ORAL
Qty: 90 TABLET | Refills: 0 | Status: SHIPPED | OUTPATIENT
Start: 2019-01-28 | End: 2019-03-04

## 2019-01-28 NOTE — TELEPHONE ENCOUNTER
Last office visit:  09/12/18  Last refill:  11/01/18   #90, no refills  Last cmp:  12/03/18  Last bp: 09/12/18   112/68    No future appointments.   Sending mychart to follow up on BP

## 2019-03-04 ENCOUNTER — TELEPHONE (OUTPATIENT)
Dept: FAMILY MEDICINE CLINIC | Facility: CLINIC | Age: 59
End: 2019-03-04

## 2019-03-04 DIAGNOSIS — I10 ESSENTIAL HYPERTENSION: ICD-10-CM

## 2019-03-04 DIAGNOSIS — E03.9 HYPOTHYROIDISM, UNSPECIFIED TYPE: Primary | ICD-10-CM

## 2019-03-04 RX ORDER — LISINOPRIL AND HYDROCHLOROTHIAZIDE 12.5; 1 MG/1; MG/1
1 TABLET ORAL
Qty: 10 TABLET | Refills: 0 | Status: SHIPPED | OUTPATIENT
Start: 2019-03-04 | End: 2019-05-09

## 2019-03-04 RX ORDER — LEVOTHYROXINE SODIUM 88 UG/1
88 TABLET ORAL
Qty: 10 TABLET | Refills: 0 | Status: SHIPPED | OUTPATIENT
Start: 2019-03-04 | End: 2019-03-07

## 2019-03-04 RX ORDER — NEBIVOLOL 10 MG/1
10 TABLET ORAL
Qty: 10 TABLET | Refills: 0 | Status: SHIPPED | OUTPATIENT
Start: 2019-03-04 | End: 2020-06-15

## 2019-03-04 NOTE — TELEPHONE ENCOUNTER
Scripts filled since she is in [facility] with . He has a brain bleed after hitting head in jan. 2019

## 2019-03-07 RX ORDER — LEVOTHYROXINE SODIUM 88 UG/1
TABLET ORAL
Qty: 90 TABLET | Refills: 2 | Status: SHIPPED | OUTPATIENT
Start: 2019-03-07 | End: 2019-12-03

## 2019-03-18 ENCOUNTER — TELEPHONE (OUTPATIENT)
Dept: FAMILY MEDICINE CLINIC | Facility: CLINIC | Age: 59
End: 2019-03-18

## 2019-03-18 NOTE — TELEPHONE ENCOUNTER
She scheduled labs for this week for her thyroid. She asked if she needed any fasting labs? I told her I would only call her back if she needed to fast otherwise her thyroid is non-fasting.

## 2019-03-19 ENCOUNTER — TELEPHONE (OUTPATIENT)
Dept: FAMILY MEDICINE CLINIC | Facility: CLINIC | Age: 59
End: 2019-03-19

## 2019-03-23 ENCOUNTER — OFFICE VISIT (OUTPATIENT)
Dept: FAMILY MEDICINE CLINIC | Facility: CLINIC | Age: 59
End: 2019-03-23
Payer: COMMERCIAL

## 2019-03-23 ENCOUNTER — TELEPHONE (OUTPATIENT)
Dept: FAMILY MEDICINE CLINIC | Facility: CLINIC | Age: 59
End: 2019-03-23

## 2019-03-23 VITALS
TEMPERATURE: 100 F | WEIGHT: 162.5 LBS | DIASTOLIC BLOOD PRESSURE: 82 MMHG | SYSTOLIC BLOOD PRESSURE: 134 MMHG | BODY MASS INDEX: 26 KG/M2

## 2019-03-23 DIAGNOSIS — J01.90 ACUTE RHINOSINUSITIS: Primary | ICD-10-CM

## 2019-03-23 DIAGNOSIS — R05.9 COUGH: ICD-10-CM

## 2019-03-23 PROCEDURE — 99213 OFFICE O/P EST LOW 20 MIN: CPT | Performed by: FAMILY MEDICINE

## 2019-03-23 RX ORDER — AZITHROMYCIN 250 MG/1
TABLET, FILM COATED ORAL
Qty: 6 TABLET | Refills: 0 | Status: SHIPPED | OUTPATIENT
Start: 2019-03-23 | End: 2019-10-22 | Stop reason: ALTCHOICE

## 2019-03-23 RX ORDER — PREDNISONE 20 MG/1
TABLET ORAL
Qty: 15 TABLET | Refills: 0 | Status: SHIPPED | OUTPATIENT
Start: 2019-03-23 | End: 2019-06-27

## 2019-03-23 NOTE — PATIENT INSTRUCTIONS
.  Azithromycin tablets  Brand Names: Zithromax, Zithromax Tri-Geo, Zithromax Z-Geo  What is this medicine? AZITHROMYCIN (az ith mena MYE sin) is a macrolide antibiotic. It is used to treat or prevent certain kinds of bacterial infections.  It will not work This medicine may also interact with the following medications:  · amiodarone  · antacids  · birth control pills  · cyclosporine  · digoxin  · magnesium  · nelfinavir  · phenytoin  · warfarin  What if I miss a dose?   If you miss a dose, take it as soon as

## 2019-03-23 NOTE — PROGRESS NOTES
Patient presents with:  Cough: cough/congestion & fever since Thursday Rm #6      HPI:   Kathy Child is a 62year old female who presents for upper respiratory symptoms for  3  days.  Patient reports sore throat, congestion, fever with Tmax to 101, dry c Breast Cancer Mother 48   • Breast Cancer Sister 39   • Ovarian Cancer Maternal Grandmother 66      Social History    Tobacco Use      Smoking status: Never Smoker      Smokeless tobacco: Never Used    Alcohol use:  Yes      Alcohol/week: 4.2 - 8.4 oz file.    Sang Zambrano M.D., FAAFP

## 2019-03-26 ENCOUNTER — TELEPHONE (OUTPATIENT)
Dept: FAMILY MEDICINE CLINIC | Facility: CLINIC | Age: 59
End: 2019-03-26

## 2019-03-26 NOTE — TELEPHONE ENCOUNTER
SEEN Saturday BY DR Erick Soliz AND SHE HAS BEEN ON MEDICATION NOW FOR 4 DAYS AND FEELS WORSE   PLEASE ADVISE

## 2019-03-26 NOTE — TELEPHONE ENCOUNTER
DR MCGOWAN PUT HER ON ZPACK AND PREDNISONE    FEELS WORSE TODAY, COUGH SOUNDS VERY HARSH, AND NOT ABLE TO MOVE SECRETIONS; MORE DIZZY TODAY. ADVISE- ADD ADVIL COLD AND SINUS AND ALAVERT; TO SEE TOMORROW.   PATIENT HAS ALAVAERT BUT NOT SURE IF PLAIN OR D; S

## 2019-03-27 ENCOUNTER — OFFICE VISIT (OUTPATIENT)
Dept: FAMILY MEDICINE CLINIC | Facility: CLINIC | Age: 59
End: 2019-03-27
Payer: COMMERCIAL

## 2019-03-27 VITALS
HEART RATE: 67 BPM | DIASTOLIC BLOOD PRESSURE: 76 MMHG | SYSTOLIC BLOOD PRESSURE: 110 MMHG | WEIGHT: 163 LBS | BODY MASS INDEX: 26.2 KG/M2 | OXYGEN SATURATION: 98 % | HEIGHT: 66 IN | RESPIRATION RATE: 18 BRPM | TEMPERATURE: 98 F

## 2019-03-27 DIAGNOSIS — J01.90 ACUTE RHINOSINUSITIS: ICD-10-CM

## 2019-03-27 DIAGNOSIS — J40 BRONCHITIS: Primary | ICD-10-CM

## 2019-03-27 PROCEDURE — 99213 OFFICE O/P EST LOW 20 MIN: CPT | Performed by: FAMILY MEDICINE

## 2019-03-27 NOTE — PROGRESS NOTES
HPI:    Patient ID: Gabriel Miner is a 62year old female. Cough / head sharmin  W/o fever  Started thurs  Sudden onset  HPI    Review of Systems   Constitutional: Positive for fatigue. Negative for chills and fever.    HENT: Positive for congestion and rhin dry.   Vitals reviewed.       /76 (BP Location: Right arm, Patient Position: Sitting, Cuff Size: adult)   Pulse 67   Temp 98.2 °F (36.8 °C) (Temporal)   Resp 18   Ht 66\"   Wt 163 lb   SpO2 98%   BMI 26.31 kg/m²          ASSESSMENT/PLAN:   Bronchitis

## 2019-03-27 NOTE — PATIENT INSTRUCTIONS
REST,FLUIDS,ADVIL / TYLENOL PRN fever / body aches  CALL NO CHANGE WORSENING  DISCUSSED WARNING SIGNS  F/U No change 2-3 days  meds as directed / vicks

## 2019-04-20 ENCOUNTER — NURSE ONLY (OUTPATIENT)
Dept: FAMILY MEDICINE CLINIC | Facility: CLINIC | Age: 59
End: 2019-04-20
Payer: COMMERCIAL

## 2019-04-20 ENCOUNTER — TELEPHONE (OUTPATIENT)
Dept: FAMILY MEDICINE CLINIC | Facility: CLINIC | Age: 59
End: 2019-04-20

## 2019-04-20 DIAGNOSIS — R30.0 DYSURIA: Primary | ICD-10-CM

## 2019-04-20 DIAGNOSIS — R39.15 URINARY URGENCY: ICD-10-CM

## 2019-04-20 PROCEDURE — 87086 URINE CULTURE/COLONY COUNT: CPT | Performed by: FAMILY MEDICINE

## 2019-04-20 PROCEDURE — 81003 URINALYSIS AUTO W/O SCOPE: CPT | Performed by: FAMILY MEDICINE

## 2019-04-20 NOTE — TELEPHONE ENCOUNTER
Dr. French Solid response stating she will address and we can schedule nurse visit. Contacted patient, appt scheduled.

## 2019-04-20 NOTE — PROGRESS NOTES
Pt provided urine specimen, instructed I will contact her on cell phone with further instructions. Urine culture collected as well.

## 2019-04-20 NOTE — TELEPHONE ENCOUNTER
Contacted patient. States Thursday night / early Friday morning she woke up with dysuria, frequency and urgency. Sx persist this morning. C/o chills, denies fevers. Denies flank pain. C/o suprapubic pressure.      Last UTI was December 2018 - Klebsiella

## 2019-05-09 RX ORDER — LISINOPRIL AND HYDROCHLOROTHIAZIDE 12.5; 1 MG/1; MG/1
TABLET ORAL
Qty: 90 TABLET | Refills: 0 | Status: SHIPPED | OUTPATIENT
Start: 2019-05-09 | End: 2019-08-05

## 2019-05-09 NOTE — TELEPHONE ENCOUNTER
Lisinopril-HCTZ refill request.     Last office visit: 3/27/2019  Last refill: 3/4/2019, #10  Labs due: 6/4/2019    No future appointments.

## 2019-06-10 ENCOUNTER — OFFICE VISIT (OUTPATIENT)
Dept: FAMILY MEDICINE CLINIC | Facility: CLINIC | Age: 59
End: 2019-06-10
Payer: COMMERCIAL

## 2019-06-10 VITALS
TEMPERATURE: 97 F | WEIGHT: 168.25 LBS | HEIGHT: 66 IN | OXYGEN SATURATION: 99 % | DIASTOLIC BLOOD PRESSURE: 74 MMHG | SYSTOLIC BLOOD PRESSURE: 138 MMHG | HEART RATE: 66 BPM | BODY MASS INDEX: 27.04 KG/M2

## 2019-06-10 DIAGNOSIS — M62.838 MUSCLE SPASMS OF NECK: ICD-10-CM

## 2019-06-10 DIAGNOSIS — M54.2 NECK PAIN: Primary | ICD-10-CM

## 2019-06-10 PROCEDURE — 99213 OFFICE O/P EST LOW 20 MIN: CPT | Performed by: NURSE PRACTITIONER

## 2019-06-10 RX ORDER — CYCLOBENZAPRINE HCL 10 MG
10 TABLET ORAL 3 TIMES DAILY
Qty: 30 TABLET | Refills: 1 | Status: SHIPPED | OUTPATIENT
Start: 2019-06-10 | End: 2019-06-30

## 2019-06-10 RX ORDER — PREDNISONE 20 MG/1
20 TABLET ORAL 2 TIMES DAILY
Qty: 10 TABLET | Refills: 0 | Status: SHIPPED | OUTPATIENT
Start: 2019-06-10 | End: 2019-06-15

## 2019-06-10 NOTE — PROGRESS NOTES
HPI:   Neck Pain    This is a new problem. Episode onset: 2 weeks, after wearing a leaf blower back pack. The problem occurs constantly. The problem has been waxing and waning. The quality of the pain is described as aching.  The symptoms are aggravated b OTHER SURGICAL HISTORY  2002    ovary removed - L   • TONSILLECTOMY     • TUBAL LIGATION        Family History   Problem Relation Age of Onset   • Breast Cancer Mother 48   • Breast Cancer Sister 39   • Ovarian Cancer Maternal Grandmother 66      Social Hi

## 2019-06-10 NOTE — PATIENT INSTRUCTIONS
Neck Spasm   A spasm of the neck muscles can happen after a sudden awkward neck movement. Sleeping with your neck in a crooked position can also cause spasm.  Some people respond to emotional stress by tensing the muscles of their neck, shoulders, and upp Follow up with your healthcare provider if your symptoms don't show signs of improvement after one week. Physical therapy or further tests may be needed.   If X-rays, CT scans, or MRI scans were taken, you will be told of any new findings that may affect yo

## 2019-06-14 DIAGNOSIS — I10 ESSENTIAL HYPERTENSION: ICD-10-CM

## 2019-06-14 RX ORDER — NEBIVOLOL HYDROCHLORIDE 10 MG/1
TABLET ORAL
Qty: 90 TABLET | Refills: 1 | Status: SHIPPED | OUTPATIENT
Start: 2019-06-14 | End: 2019-12-03

## 2019-06-27 ENCOUNTER — OFFICE VISIT (OUTPATIENT)
Dept: FAMILY MEDICINE CLINIC | Facility: CLINIC | Age: 59
End: 2019-06-27
Payer: COMMERCIAL

## 2019-06-27 VITALS
HEART RATE: 86 BPM | OXYGEN SATURATION: 99 % | WEIGHT: 166 LBS | DIASTOLIC BLOOD PRESSURE: 80 MMHG | BODY MASS INDEX: 26.68 KG/M2 | TEMPERATURE: 98 F | SYSTOLIC BLOOD PRESSURE: 120 MMHG | HEIGHT: 66 IN

## 2019-06-27 DIAGNOSIS — J01.90 ACUTE RHINOSINUSITIS: ICD-10-CM

## 2019-06-27 DIAGNOSIS — H66.92 ACUTE OTITIS MEDIA, LEFT: Primary | ICD-10-CM

## 2019-06-27 PROCEDURE — 99213 OFFICE O/P EST LOW 20 MIN: CPT | Performed by: NURSE PRACTITIONER

## 2019-06-27 RX ORDER — AZITHROMYCIN 250 MG/1
TABLET, FILM COATED ORAL
Qty: 6 TABLET | Refills: 0 | Status: SHIPPED | OUTPATIENT
Start: 2019-06-27 | End: 2019-10-22 | Stop reason: ALTCHOICE

## 2019-06-27 NOTE — PROGRESS NOTES
HPI:   Sinus Problem   This is a new problem. Episode onset: 2 days ago. The problem has been gradually worsening since onset.  Associated symptoms include chills (this morning), congestion, ear pain (left only), headaches, sinus pressure and a sore throa TUBAL LIGATION        Family History   Problem Relation Age of Onset   • Breast Cancer Mother 48   • Breast Cancer Sister 39   • Ovarian Cancer Maternal Grandmother 66      Social History    Tobacco Use      Smoking status: Never Smoker      Smokeless toba

## 2019-07-30 ENCOUNTER — APPOINTMENT (OUTPATIENT)
Dept: LAB | Age: 59
End: 2019-07-30
Attending: FAMILY MEDICINE
Payer: COMMERCIAL

## 2019-07-30 DIAGNOSIS — E03.9 HYPOTHYROIDISM, UNSPECIFIED TYPE: ICD-10-CM

## 2019-07-30 DIAGNOSIS — E78.00 HYPERCHOLESTEROLEMIA: ICD-10-CM

## 2019-07-30 DIAGNOSIS — I10 ESSENTIAL HYPERTENSION: ICD-10-CM

## 2019-07-30 DIAGNOSIS — E55.9 VITAMIN D DEFICIENCY: ICD-10-CM

## 2019-07-30 LAB
ALBUMIN SERPL-MCNC: 3.8 G/DL (ref 3.4–5)
ALBUMIN/GLOB SERPL: 1 {RATIO} (ref 1–2)
ALP LIVER SERPL-CCNC: 100 U/L (ref 46–118)
ALT SERPL-CCNC: 27 U/L (ref 13–56)
ANION GAP SERPL CALC-SCNC: 6 MMOL/L (ref 0–18)
AST SERPL-CCNC: 19 U/L (ref 15–37)
BILIRUB SERPL-MCNC: 0.4 MG/DL (ref 0.1–2)
BUN BLD-MCNC: 11 MG/DL (ref 7–18)
BUN/CREAT SERPL: 13.1 (ref 10–20)
CALCIUM BLD-MCNC: 9.4 MG/DL (ref 8.5–10.1)
CHLORIDE SERPL-SCNC: 104 MMOL/L (ref 98–112)
CHOLEST SMN-MCNC: 274 MG/DL (ref ?–200)
CO2 SERPL-SCNC: 29 MMOL/L (ref 21–32)
CREAT BLD-MCNC: 0.84 MG/DL (ref 0.55–1.02)
GLOBULIN PLAS-MCNC: 4 G/DL (ref 2.8–4.4)
GLUCOSE BLD-MCNC: 128 MG/DL (ref 70–99)
HDLC SERPL-MCNC: 41 MG/DL (ref 40–59)
LDLC SERPL CALC-MCNC: 164 MG/DL (ref ?–100)
M PROTEIN MFR SERPL ELPH: 7.8 G/DL (ref 6.4–8.2)
NONHDLC SERPL-MCNC: 233 MG/DL (ref ?–130)
OSMOLALITY SERPL CALC.SUM OF ELEC: 289 MOSM/KG (ref 275–295)
POTASSIUM SERPL-SCNC: 3.9 MMOL/L (ref 3.5–5.1)
SODIUM SERPL-SCNC: 139 MMOL/L (ref 136–145)
TRIGL SERPL-MCNC: 345 MG/DL (ref 30–149)
TSI SER-ACNC: 2.73 MIU/ML (ref 0.36–3.74)
VIT D+METAB SERPL-MCNC: 42 NG/ML (ref 30–100)
VLDLC SERPL CALC-MCNC: 69 MG/DL (ref 0–30)

## 2019-07-30 PROCEDURE — 84443 ASSAY THYROID STIM HORMONE: CPT | Performed by: FAMILY MEDICINE

## 2019-07-30 PROCEDURE — 82306 VITAMIN D 25 HYDROXY: CPT | Performed by: FAMILY MEDICINE

## 2019-07-30 PROCEDURE — 80061 LIPID PANEL: CPT | Performed by: FAMILY MEDICINE

## 2019-07-30 PROCEDURE — 36415 COLL VENOUS BLD VENIPUNCTURE: CPT | Performed by: FAMILY MEDICINE

## 2019-07-30 PROCEDURE — 80053 COMPREHEN METABOLIC PANEL: CPT | Performed by: FAMILY MEDICINE

## 2019-07-31 DIAGNOSIS — I10 ESSENTIAL HYPERTENSION: Primary | ICD-10-CM

## 2019-07-31 DIAGNOSIS — E78.00 HYPERCHOLESTEROLEMIA: ICD-10-CM

## 2019-08-05 RX ORDER — LISINOPRIL AND HYDROCHLOROTHIAZIDE 12.5; 1 MG/1; MG/1
TABLET ORAL
Qty: 90 TABLET | Refills: 1 | Status: SHIPPED | OUTPATIENT
Start: 2019-08-05 | End: 2020-02-24

## 2019-10-18 ENCOUNTER — TELEPHONE (OUTPATIENT)
Dept: FAMILY MEDICINE CLINIC | Facility: CLINIC | Age: 59
End: 2019-10-18

## 2019-10-18 ENCOUNTER — OFFICE VISIT (OUTPATIENT)
Dept: FAMILY MEDICINE CLINIC | Facility: CLINIC | Age: 59
End: 2019-10-18
Payer: COMMERCIAL

## 2019-10-18 VITALS
HEART RATE: 60 BPM | DIASTOLIC BLOOD PRESSURE: 80 MMHG | BODY MASS INDEX: 26.54 KG/M2 | RESPIRATION RATE: 18 BRPM | OXYGEN SATURATION: 99 % | WEIGHT: 165.13 LBS | HEIGHT: 66 IN | SYSTOLIC BLOOD PRESSURE: 128 MMHG | TEMPERATURE: 98 F

## 2019-10-18 DIAGNOSIS — R10.2 PELVIC PRESSURE IN FEMALE: ICD-10-CM

## 2019-10-18 DIAGNOSIS — R35.0 URINARY FREQUENCY: ICD-10-CM

## 2019-10-18 DIAGNOSIS — R30.0 DYSURIA: Primary | ICD-10-CM

## 2019-10-18 PROCEDURE — 99213 OFFICE O/P EST LOW 20 MIN: CPT | Performed by: NURSE PRACTITIONER

## 2019-10-18 PROCEDURE — 81003 URINALYSIS AUTO W/O SCOPE: CPT | Performed by: NURSE PRACTITIONER

## 2019-10-18 PROCEDURE — 87086 URINE CULTURE/COLONY COUNT: CPT | Performed by: NURSE PRACTITIONER

## 2019-10-18 RX ORDER — NITROFURANTOIN 25; 75 MG/1; MG/1
100 CAPSULE ORAL 2 TIMES DAILY
Qty: 14 CAPSULE | Refills: 0 | Status: SHIPPED | OUTPATIENT
Start: 2019-10-18 | End: 2019-10-25

## 2019-10-18 NOTE — TELEPHONE ENCOUNTER
SHE WANTS TO KNOW IF SHE CAN BRING IN A SAMPLE BECAUSE SHE THINKS SHE MIGHT HAVE A BLADDER INFECTION

## 2019-10-18 NOTE — PROGRESS NOTES
HPI: UTI   This is a new problem. Episode onset: Wednesday. The problem occurs constantly. The problem has been unchanged. Associated symptoms include swollen glands.  Pertinent negatives include no chest pain, chills, coughing, fatigue, fever, myalgias of Onset   • Breast Cancer Mother 48   • Breast Cancer Sister 39   • Ovarian Cancer Maternal Grandmother 66      Social History    Tobacco Use      Smoking status: Never Smoker      Smokeless tobacco: Never Used    Alcohol use:  Yes      Alcohol/week: 7.0 -

## 2019-10-22 ENCOUNTER — OFFICE VISIT (OUTPATIENT)
Dept: FAMILY MEDICINE CLINIC | Facility: CLINIC | Age: 59
End: 2019-10-22
Payer: COMMERCIAL

## 2019-10-22 VITALS — TEMPERATURE: 97 F | SYSTOLIC BLOOD PRESSURE: 122 MMHG | DIASTOLIC BLOOD PRESSURE: 66 MMHG

## 2019-10-22 DIAGNOSIS — M54.2 NECK PAIN: Primary | ICD-10-CM

## 2019-10-22 DIAGNOSIS — F43.9 STRESS AT HOME: ICD-10-CM

## 2019-10-22 DIAGNOSIS — M54.6 BILATERAL THORACIC BACK PAIN, UNSPECIFIED CHRONICITY: ICD-10-CM

## 2019-10-22 PROCEDURE — 99214 OFFICE O/P EST MOD 30 MIN: CPT | Performed by: FAMILY MEDICINE

## 2019-10-22 PROCEDURE — 98929 OSTEOPATH MANJ 9-10 REGIONS: CPT | Performed by: FAMILY MEDICINE

## 2019-10-22 NOTE — PROGRESS NOTES
HPI:    Patient ID: Rupert Espinosa is a 61year old female. Pt with complaints of neck pain, upper back pain. Right hip discomfort. Symptoms daily. Denies numbness, tingling. Without trauma. Increased stress.   Symptoms worse over the last couple week Chest breathing  Psoas/glutes 0/0  Jaw tight  Elevated first rib  Piriformis 90 degrees  Decreased shoulder/sternocleidomastoid  Calf tight, decreased tibialis anterior  Tight thoracic paraspinal musculature  Visuals decreased right  Activations x9  Abdo

## 2019-10-29 ENCOUNTER — TELEPHONE (OUTPATIENT)
Dept: FAMILY MEDICINE CLINIC | Facility: CLINIC | Age: 59
End: 2019-10-29

## 2019-10-29 RX ORDER — AMITRIPTYLINE HYDROCHLORIDE 25 MG/1
25 TABLET, FILM COATED ORAL NIGHTLY
Qty: 30 TABLET | Refills: 0 | Status: SHIPPED | OUTPATIENT
Start: 2019-10-29 | End: 2019-11-26

## 2019-10-29 NOTE — TELEPHONE ENCOUNTER
Patient instructed. Routed back to Dr Alejandro Carranza to re evaluate elavil script due to allergy alert pop up.

## 2019-10-31 ENCOUNTER — OFFICE VISIT (OUTPATIENT)
Dept: FAMILY MEDICINE CLINIC | Facility: CLINIC | Age: 59
End: 2019-10-31
Payer: COMMERCIAL

## 2019-10-31 VITALS — DIASTOLIC BLOOD PRESSURE: 70 MMHG | SYSTOLIC BLOOD PRESSURE: 118 MMHG | TEMPERATURE: 98 F

## 2019-10-31 DIAGNOSIS — M54.2 NECK PAIN: Primary | ICD-10-CM

## 2019-10-31 DIAGNOSIS — M54.6 BILATERAL THORACIC BACK PAIN, UNSPECIFIED CHRONICITY: ICD-10-CM

## 2019-10-31 PROCEDURE — 98925 OSTEOPATH MANJ 1-2 REGIONS: CPT | Performed by: FAMILY MEDICINE

## 2019-10-31 NOTE — PROGRESS NOTES
HPI:    Patient ID: Breonna Camejo is a 61year old female. Tues neck pain / yard work aggravated  Massage yest  Better yest    HPI    Review of Systems   Musculoskeletal: Positive for neck pain and neck stiffness.    Neurological: Negative for weakness an encounter.       Meds This Visit:  Requested Prescriptions      No prescriptions requested or ordered in this encounter       Imaging & Referrals:  None       #[id number]

## 2019-11-05 ENCOUNTER — TELEPHONE (OUTPATIENT)
Dept: FAMILY MEDICINE CLINIC | Facility: CLINIC | Age: 59
End: 2019-11-05

## 2019-11-05 DIAGNOSIS — M54.2 NECK PAIN: Primary | ICD-10-CM

## 2019-11-05 NOTE — PROGRESS NOTES
SPINE EVALUATION:   Referring Physician: Dr. Charlene Faith  Diagnosis: Neck pain     Date of Service: 11/5/2019     [facility] 61[facility] who presents to therapy today with complaints of neck pain since June which worsened late suboccipital region, B UT, and cervical paraspinals.     Strength: (* denotes performed with pain)  UE/Scapular   Shoulder Flex: R 5/5, L 5/5  Shoulder ABD (C5): R 5/5, L 5/5  Biceps (C6): R 5/5, L 5/5  Wrist ext (C6): R 5/5, L 5/5  Triceps (C7): R 5/5, L 5 mobility to WNL to improve cervical ROM as well as promote upright posturing and decreased pain with yard work   · Pt will report decreased frequency of headaches to <2x/week  · Pt will improve postural strength (mid/low trap) to 5/5 to promote improved up

## 2019-11-05 NOTE — TELEPHONE ENCOUNTER
Per Dr Lala Nava- recommend physical therapy for neck pain. Order entered. Patient will call for appt.

## 2019-11-06 ENCOUNTER — OFFICE VISIT (OUTPATIENT)
Dept: PHYSICAL THERAPY | Age: 59
End: 2019-11-06
Attending: FAMILY MEDICINE
Payer: COMMERCIAL

## 2019-11-06 DIAGNOSIS — M54.2 NECK PAIN: ICD-10-CM

## 2019-11-06 PROCEDURE — 97035 APP MDLTY 1+ULTRASOUND EA 15: CPT

## 2019-11-06 PROCEDURE — 97162 PT EVAL MOD COMPLEX 30 MIN: CPT

## 2019-11-06 PROCEDURE — 97140 MANUAL THERAPY 1/> REGIONS: CPT

## 2019-11-07 ENCOUNTER — OFFICE VISIT (OUTPATIENT)
Dept: PHYSICAL THERAPY | Age: 59
End: 2019-11-07
Attending: FAMILY MEDICINE
Payer: COMMERCIAL

## 2019-11-07 DIAGNOSIS — M54.2 NECK PAIN: ICD-10-CM

## 2019-11-07 PROCEDURE — 97035 APP MDLTY 1+ULTRASOUND EA 15: CPT

## 2019-11-07 PROCEDURE — 97140 MANUAL THERAPY 1/> REGIONS: CPT

## 2019-11-07 PROCEDURE — 97110 THERAPEUTIC EXERCISES: CPT

## 2019-11-07 NOTE — PROGRESS NOTES
Dx: Neck pain         Insurance (Authorized # of Visits):  Med necessity           Authorizing Physician: Dr. Josee Bennett  Next MD visit: none scheduled  Fall Risk: standard         Precautions: n/a             Subjective: A little sore from yesterday's evaluat release    Charges: man therapy, ultrasound, therapeutic ex       Total Timed Treatment: 45 min  Total Treatment Time: 45 min

## 2019-11-11 ENCOUNTER — OFFICE VISIT (OUTPATIENT)
Dept: PHYSICAL THERAPY | Age: 59
End: 2019-11-11
Attending: FAMILY MEDICINE
Payer: COMMERCIAL

## 2019-11-11 DIAGNOSIS — M54.2 NECK PAIN: ICD-10-CM

## 2019-11-11 PROCEDURE — 97110 THERAPEUTIC EXERCISES: CPT

## 2019-11-11 PROCEDURE — 97035 APP MDLTY 1+ULTRASOUND EA 15: CPT

## 2019-11-11 PROCEDURE — 97140 MANUAL THERAPY 1/> REGIONS: CPT

## 2019-11-11 NOTE — PROGRESS NOTES
Dx: Neck pain         Insurance (Authorized # of Visits):  Med necessity           Authorizing Physician: Dr. Aj Montoya MD visit: none scheduled  Fall Risk: standard         Precautions: n/a             Subjective: Soreness from raking leaves yesterday. distraction MANUAL THERAPY  STM cervical paraspinals, suboccipitals, B UT  Sub occipital release  Manual cervical distraction      ULTRASOUND  3Mhz 1 w/cm2 8' to subocciptals ULTRASOUND  3Mhz 1 w/cm2 8' to subocciptals             HEP: pec stretch, subocci

## 2019-11-14 ENCOUNTER — OFFICE VISIT (OUTPATIENT)
Dept: PHYSICAL THERAPY | Age: 59
End: 2019-11-14
Attending: FAMILY MEDICINE
Payer: COMMERCIAL

## 2019-11-14 DIAGNOSIS — M54.2 NECK PAIN: ICD-10-CM

## 2019-11-14 PROCEDURE — 97035 APP MDLTY 1+ULTRASOUND EA 15: CPT

## 2019-11-14 PROCEDURE — 97140 MANUAL THERAPY 1/> REGIONS: CPT

## 2019-11-14 PROCEDURE — 97110 THERAPEUTIC EXERCISES: CPT

## 2019-11-14 NOTE — PROGRESS NOTES
Dx: Neck pain         Insurance (Authorized # of Visits):  Med necessity           Authorizing Physician: Dr. Kevin Kennedy  Next MD visit: none scheduled  Fall Risk: standard         Precautions: n/a             Subjective: Headaches getting better, neck is feel 2x10  Chin tuck 2x10  tband row grn 3x10  tband ext grn 3x10     MANUAL THERAPY  STM cervical paraspinals, suboccipitals, B UT  Sub occipital release  Manual cervical distraction MANUAL THERAPY  STM cervical paraspinals, suboccipitals, B UT  Sub occipital

## 2019-11-18 ENCOUNTER — OFFICE VISIT (OUTPATIENT)
Dept: PHYSICAL THERAPY | Age: 59
End: 2019-11-18
Attending: FAMILY MEDICINE
Payer: COMMERCIAL

## 2019-11-18 DIAGNOSIS — M54.2 NECK PAIN: ICD-10-CM

## 2019-11-18 PROCEDURE — 97140 MANUAL THERAPY 1/> REGIONS: CPT

## 2019-11-18 PROCEDURE — 97035 APP MDLTY 1+ULTRASOUND EA 15: CPT

## 2019-11-18 PROCEDURE — 97110 THERAPEUTIC EXERCISES: CPT

## 2019-11-18 NOTE — PROGRESS NOTES
Dx: Neck pain         Insurance (Authorized # of Visits):  Med necessity           Authorizing Physician: Dr. Renita Frost  Next MD visit: none scheduled  Fall Risk: standard         Precautions: n/a             Subjective: Continue to feel reduction in headache tuck 2x10  tband row grn 3x10  tband ext grn 3x10 THERAPEUTIC EX  UBE 3/3  pec stretch 4'  B UT stretch 3x30\"  Scapular retractions 2x10  Chin tuck 2x10  tband row grn 3x10  tband ext grn 3x10  tband LT raise yellow 3x10    MANUAL THERAPY  STM cervical pa

## 2019-11-21 ENCOUNTER — OFFICE VISIT (OUTPATIENT)
Dept: PHYSICAL THERAPY | Age: 59
End: 2019-11-21
Attending: FAMILY MEDICINE
Payer: COMMERCIAL

## 2019-11-21 DIAGNOSIS — M54.2 NECK PAIN: ICD-10-CM

## 2019-11-21 PROCEDURE — 97035 APP MDLTY 1+ULTRASOUND EA 15: CPT

## 2019-11-21 PROCEDURE — 97110 THERAPEUTIC EXERCISES: CPT

## 2019-11-21 PROCEDURE — 97140 MANUAL THERAPY 1/> REGIONS: CPT

## 2019-11-21 NOTE — PROGRESS NOTES
Dx: Neck pain         Insurance (Authorized # of Visits):  Med necessity           Authorizing Physician: Dr. Renita Frost  Next MD visit: none scheduled  Fall Risk: standard         Precautions: n/a             Subjective:  Increased neck pain today, not sure wh stretch 3x30\"  Scapular retractions 2x10  Chin tuck 2x10  tband row grn 3x10  tband ext grn 3x10 THERAPEUTIC EX  UBE 3/3  pec stretch 4'  B UT stretch 3x30\"  Scapular retractions 2x10  Chin tuck 2x10  tband row grn 3x10  tband ext grn 3x10  tband LT rais

## 2019-11-25 ENCOUNTER — OFFICE VISIT (OUTPATIENT)
Dept: PHYSICAL THERAPY | Age: 59
End: 2019-11-25
Attending: FAMILY MEDICINE
Payer: COMMERCIAL

## 2019-11-25 DIAGNOSIS — M54.2 NECK PAIN: ICD-10-CM

## 2019-11-25 PROCEDURE — 97140 MANUAL THERAPY 1/> REGIONS: CPT

## 2019-11-25 PROCEDURE — 97110 THERAPEUTIC EXERCISES: CPT

## 2019-11-25 PROCEDURE — 97035 APP MDLTY 1+ULTRASOUND EA 15: CPT

## 2019-11-25 NOTE — PROGRESS NOTES
Dx: Neck pain         Insurance (Authorized # of Visits):  Med necessity           Authorizing Physician: Dr. Curtis Escboar  Next MD visit: none scheduled  Fall Risk: standard         Precautions: n/a             Subjective: Neck feeling better, less headaches.  G THERAPEUTIC EX  UBE 3/3  pec stretch 4'  B UT stretch 3x30\"  Scapular retractions 2x10  Chin tuck 2x10  tband row grn 3x10  tband ext grn 3x10  tband LT raise yellow 3x10 THERAPEUTIC EX  UBE 3/3  pec stretch 4'  B UT stretch 3x30\"  Scapular retractions 2

## 2019-11-26 RX ORDER — AMITRIPTYLINE HYDROCHLORIDE 25 MG/1
25 TABLET, FILM COATED ORAL NIGHTLY
Qty: 30 TABLET | Refills: 0 | Status: SHIPPED | OUTPATIENT
Start: 2019-11-26 | End: 2019-12-23

## 2019-11-26 NOTE — TELEPHONE ENCOUNTER
Amitriptyline HCl (ELAVIL) 25 MG Oral Tab Please call into [facility] Daily Progress Note             Subjective  Patient reports that the neck and back are the same, rates the pain at 8/10.    Objective   See Exercise, Manual, and Modality Logs for complete treatment.       Assessment/Plan  Patient is in no acute distress despite the high pain rating.  Patient tolerated the progression of exercises without c/o pain, but did have minimal visual signs of discomfort with some of the exercises.                     Manual Therapy:    0     mins  03853;  Therapeutic Exercise:    39     mins  00168;     Neuromuscular Cliff:    0    mins  19631;    Therapeutic Activity:     0     mins  34431;     Gait Trainin     mins  10353;     Ultrasound:     0     mins  00713;    Work Hardening           0      mins 41270  Iontophoresis               0   mins 02671    Timed Treatment:   39   mins   Total Treatment:     54   mins    Greg Davidson, PT  Physical Therapist

## 2019-11-26 NOTE — TELEPHONE ENCOUNTER
LOV 10/31/19    LAST LAB current, due 1/20    LAST RX 10/29/19 #30    Next OV none scheduled here    PROTOCOL

## 2019-11-27 ENCOUNTER — OFFICE VISIT (OUTPATIENT)
Dept: PHYSICAL THERAPY | Age: 59
End: 2019-11-27
Attending: FAMILY MEDICINE
Payer: COMMERCIAL

## 2019-11-27 DIAGNOSIS — M54.2 NECK PAIN: ICD-10-CM

## 2019-11-27 PROCEDURE — 97140 MANUAL THERAPY 1/> REGIONS: CPT

## 2019-11-27 PROCEDURE — 97035 APP MDLTY 1+ULTRASOUND EA 15: CPT

## 2019-11-27 PROCEDURE — 97110 THERAPEUTIC EXERCISES: CPT

## 2019-11-27 NOTE — PROGRESS NOTES
Dx: Neck pain         Insurance (Authorized # of Visits):  Med necessity           Authorizing Physician: Dr. Bradford Jose  Next MD visit: none scheduled  Fall Risk: standard         Precautions: n/a             Subjective: Continue to feel better overall, less ext grn 3x10 THERAPEUTIC EX  UBE 3/3  pec stretch 4'  B UT stretch 3x30\"  Scapular retractions 2x10  Chin tuck 2x10  tband row grn 3x10  tband ext grn 3x10 THERAPEUTIC EX  UBE 3/3  pec stretch 4'  B UT stretch 3x30\"  Scapular retractions 2x10  Chin tuck hamzah sommers, ext, LT    Charges: man therapy, ultrasound, therapeutic ex       Total Timed Treatment: 45 min  Total Treatment Time: 45 min

## 2019-12-03 DIAGNOSIS — I10 ESSENTIAL HYPERTENSION: ICD-10-CM

## 2019-12-03 RX ORDER — NEBIVOLOL HYDROCHLORIDE 10 MG/1
TABLET ORAL
Qty: 90 TABLET | Refills: 1 | Status: SHIPPED | OUTPATIENT
Start: 2019-12-03 | End: 2020-05-20

## 2019-12-03 RX ORDER — LEVOTHYROXINE SODIUM 88 UG/1
TABLET ORAL
Qty: 90 TABLET | Refills: 1 | Status: SHIPPED | OUTPATIENT
Start: 2019-12-03 | End: 2020-06-16

## 2019-12-03 NOTE — TELEPHONE ENCOUNTER
Last refill on bystolic #27 x 1 on 1/70/41  Last refill on levothyroxine #90 x 2 on 3/7/19  Last office visit on 10/31/19  No future appointments.   BP Readings from Last 3 Encounters:  10/31/19 : 118/70  10/22/19 : 122/66  10/18/19 : 128/80    Labs current

## 2019-12-23 RX ORDER — AMITRIPTYLINE HYDROCHLORIDE 25 MG/1
TABLET, FILM COATED ORAL
Qty: 90 TABLET | Refills: 0 | Status: SHIPPED | OUTPATIENT
Start: 2019-12-23 | End: 2020-03-17

## 2020-02-24 RX ORDER — LISINOPRIL AND HYDROCHLOROTHIAZIDE 12.5; 1 MG/1; MG/1
TABLET ORAL
Qty: 90 TABLET | Refills: 1 | Status: SHIPPED | OUTPATIENT
Start: 2020-02-24 | End: 2020-08-19

## 2020-02-24 NOTE — TELEPHONE ENCOUNTER
LOV acute Oct 2019    LAST LAB 7/30/19    LAST RX 8/5/19 x 6 months    Next OV No future appointments.       PROTOCOL    Hypertension Medications Protocol Passed2/24 10:17 AM   CMP or BMP in past 12 months    Last serum creatinine< 2.0    Appointment in pas

## 2020-03-17 RX ORDER — AMITRIPTYLINE HYDROCHLORIDE 25 MG/1
TABLET, FILM COATED ORAL
Qty: 90 TABLET | Refills: 0 | Status: SHIPPED | OUTPATIENT
Start: 2020-03-17 | End: 2021-02-01

## 2020-03-17 NOTE — TELEPHONE ENCOUNTER
LOV  10/31/2019    LAST LAB  7/30/2019    LAST RX  12/23/2019  (90 tabs, 0 refill)    Next OV  No future appointments.     PROTOCOL  none

## 2020-05-20 DIAGNOSIS — I10 ESSENTIAL HYPERTENSION: ICD-10-CM

## 2020-05-20 RX ORDER — NEBIVOLOL HYDROCHLORIDE 10 MG/1
TABLET ORAL
Qty: 90 TABLET | Refills: 1 | Status: SHIPPED | OUTPATIENT
Start: 2020-05-20 | End: 2020-11-20

## 2020-05-20 NOTE — TELEPHONE ENCOUNTER
Pt scheduled phone visit with Dr. Carol Ann Dunn May 21st for med follow up; she has b/p machine at home. Not comfortable coming in for ov at this time.     Bystolic  Last refill: 23/14/41  Qty: 90  W/ 1 refills  Last ov: 10/31/19    Requested Prescriptions     Pen

## 2020-05-21 ENCOUNTER — VIRTUAL PHONE E/M (OUTPATIENT)
Dept: FAMILY MEDICINE CLINIC | Facility: CLINIC | Age: 60
End: 2020-05-21
Payer: COMMERCIAL

## 2020-05-21 DIAGNOSIS — E03.9 HYPOTHYROIDISM, UNSPECIFIED TYPE: ICD-10-CM

## 2020-05-21 DIAGNOSIS — I10 ESSENTIAL HYPERTENSION: Primary | ICD-10-CM

## 2020-05-21 DIAGNOSIS — E78.00 HYPERCHOLESTEROLEMIA: ICD-10-CM

## 2020-05-21 PROCEDURE — 99213 OFFICE O/P EST LOW 20 MIN: CPT | Performed by: FAMILY MEDICINE

## 2020-05-21 NOTE — PROGRESS NOTES
HPI:    Patient ID: Breonna Camejo is a 61year old female. Phone visit  Review of medications and chart. 15 minutes. Blood pressure has been stable. 120/70 range. Checks at home. Thinks she may have gained some weight. Decrease in exercise.   Withou prescriptions requested or ordered in this encounter       Imaging & Referrals:  None       FG#[id number]

## 2020-06-15 ENCOUNTER — APPOINTMENT (OUTPATIENT)
Dept: LAB | Age: 60
End: 2020-06-15
Attending: FAMILY MEDICINE
Payer: COMMERCIAL

## 2020-06-15 ENCOUNTER — OFFICE VISIT (OUTPATIENT)
Dept: FAMILY MEDICINE CLINIC | Facility: CLINIC | Age: 60
End: 2020-06-15
Payer: COMMERCIAL

## 2020-06-15 VITALS
WEIGHT: 174 LBS | BODY MASS INDEX: 27.97 KG/M2 | HEART RATE: 74 BPM | DIASTOLIC BLOOD PRESSURE: 80 MMHG | SYSTOLIC BLOOD PRESSURE: 146 MMHG | OXYGEN SATURATION: 98 % | HEIGHT: 66 IN | TEMPERATURE: 97 F

## 2020-06-15 DIAGNOSIS — E78.00 HYPERCHOLESTEROLEMIA: ICD-10-CM

## 2020-06-15 DIAGNOSIS — I10 ESSENTIAL HYPERTENSION: ICD-10-CM

## 2020-06-15 DIAGNOSIS — E03.9 HYPOTHYROIDISM, UNSPECIFIED TYPE: ICD-10-CM

## 2020-06-15 DIAGNOSIS — R10.11 RIGHT UPPER QUADRANT PAIN: ICD-10-CM

## 2020-06-15 DIAGNOSIS — R10.11 RIGHT UPPER QUADRANT PAIN: Primary | ICD-10-CM

## 2020-06-15 DIAGNOSIS — E87.1 LOW SODIUM LEVELS: Primary | ICD-10-CM

## 2020-06-15 DIAGNOSIS — T14.8XXA PUNCTURE WOUND: ICD-10-CM

## 2020-06-15 PROCEDURE — 80053 COMPREHEN METABOLIC PANEL: CPT | Performed by: FAMILY MEDICINE

## 2020-06-15 PROCEDURE — 83690 ASSAY OF LIPASE: CPT | Performed by: NURSE PRACTITIONER

## 2020-06-15 PROCEDURE — 82150 ASSAY OF AMYLASE: CPT | Performed by: NURSE PRACTITIONER

## 2020-06-15 PROCEDURE — 36415 COLL VENOUS BLD VENIPUNCTURE: CPT | Performed by: FAMILY MEDICINE

## 2020-06-15 PROCEDURE — 84439 ASSAY OF FREE THYROXINE: CPT | Performed by: NURSE PRACTITIONER

## 2020-06-15 PROCEDURE — 99214 OFFICE O/P EST MOD 30 MIN: CPT | Performed by: NURSE PRACTITIONER

## 2020-06-15 PROCEDURE — 84443 ASSAY THYROID STIM HORMONE: CPT | Performed by: NURSE PRACTITIONER

## 2020-06-15 RX ORDER — LEVOFLOXACIN 500 MG/1
500 TABLET, FILM COATED ORAL DAILY
Qty: 7 TABLET | Refills: 0 | Status: SHIPPED | OUTPATIENT
Start: 2020-06-15 | End: 2020-06-22

## 2020-06-15 NOTE — PROGRESS NOTES
HPI:   Foot Pain    The pain is present in the right foot. This is a new problem. Episode onset: 6 days ago. There has been a history of trauma (she stepped on a nail or screw while working on removing her old deck).  Episode frequency: pain occuring inte 5000 MCG Oral Tab Take 1 tablet (5 mg total) by mouth daily.  30 tablet 11      Past Medical History:   Diagnosis Date   • Disorder of thyroid    • Esophageal reflux    • High cholesterol    • PONV (postoperative nausea and vomiting)    • Unspecified essent Pulmonary/Chest: Effort normal and breath sounds normal.   Abdominal: Soft. Bowel sounds are normal. She exhibits no distension. There is no hepatosplenomegaly. There is no rebound and no guarding.  Musculoskeletal:        Feet:    Feet:     Pinpoint punc

## 2020-06-16 RX ORDER — LEVOTHYROXINE SODIUM 88 UG/1
TABLET ORAL
Qty: 90 TABLET | Refills: 1 | Status: SHIPPED | OUTPATIENT
Start: 2020-06-16 | End: 2020-09-05

## 2020-06-16 NOTE — TELEPHONE ENCOUNTER
Last OV: 10/31/19  Last refill: 12-3-19 #90 Tablets w/ 1 refill  Labs: CM;P,TSH,LIPASE, AMYLASE on 6-15-20  Requested Prescriptions     Pending Prescriptions Disp Refills   • LEVOTHYROXINE SODIUM 88 MCG Oral Tab [Pharmacy Med Name: LEVOTHYROXINE 0.088MG (8

## 2020-06-25 ENCOUNTER — HOSPITAL ENCOUNTER (OUTPATIENT)
Dept: ULTRASOUND IMAGING | Age: 60
Discharge: HOME | End: 2020-06-25
Attending: NURSE PRACTITIONER
Payer: COMMERCIAL

## 2020-06-25 DIAGNOSIS — R10.11 RIGHT UPPER QUADRANT PAIN: ICD-10-CM

## 2020-06-25 DIAGNOSIS — K82.8 SLUDGE IN GALLBLADDER: Primary | ICD-10-CM

## 2020-06-25 PROCEDURE — 76700 US EXAM ABDOM COMPLETE: CPT | Performed by: NURSE PRACTITIONER

## 2020-07-07 ENCOUNTER — OFFICE VISIT (OUTPATIENT)
Dept: SURGERY | Facility: CLINIC | Age: 60
End: 2020-07-07
Payer: COMMERCIAL

## 2020-07-07 VITALS — WEIGHT: 171 LBS | TEMPERATURE: 97 F | BODY MASS INDEX: 27.48 KG/M2 | HEIGHT: 66 IN

## 2020-07-07 DIAGNOSIS — K80.50 BILIARY COLIC: Primary | ICD-10-CM

## 2020-07-07 PROCEDURE — 99244 OFF/OP CNSLTJ NEW/EST MOD 40: CPT | Performed by: SURGERY

## 2020-07-07 NOTE — H&P
Nelli Archibald is a 61year old female  Patient presents with:  Gallbladder: Patient here for gallbladder consult. c/o sharp right abdominal pain. US abdomen done at [facility].        REFERRED BY    Patient presents with right upper quadrant right shoulder blade p 7.0 - 14.0 standard drinks      Types: 7 - 14 Standard drinks or equivalent per week      Frequency: Monthly or less      Drinks per session: 1 or 2    Drug use: No      ROS     GENERAL HEALTH: otherwise feels well, no weight loss, no fever or chills  SKIN 32.0 mmol/L Final   • Anion Gap 06/15/2020 5  0 - 18 mmol/L Final   • BUN 06/15/2020 8  7 - 18 mg/dL Final   • Creatinine 06/15/2020 0.76  0.55 - 1.02 mg/dL Final   • BUN/CREA Ratio 06/15/2020 10.5  10.0 - 20.0 Final   • Calcium, Total 06/15/2020 9.3  8.5 R) supplements resulting in serum concentrations >100 ng/mL.   Intake of the recommended daily allowance (RDA) for biotin (0.03 mg) has not been shown to typically cause significant interference; however, high dose daily dietary supplements may contain biot

## 2020-08-19 RX ORDER — LISINOPRIL AND HYDROCHLOROTHIAZIDE 12.5; 1 MG/1; MG/1
TABLET ORAL
Qty: 90 TABLET | Refills: 0 | Status: SHIPPED | OUTPATIENT
Start: 2020-08-19 | End: 2020-11-16

## 2020-08-19 NOTE — TELEPHONE ENCOUNTER
Last refill #90 x 1 on 2/24/2020  Last office visit 6/15/2020  No future appointments. BP Readings from Last 3 Encounters:  06/15/20 : 146/80  10/31/19 : 118/70  10/22/19 : 122/66    Patient is due for labs this month  Reminder letter sent.

## 2020-08-21 ENCOUNTER — TELEPHONE (OUTPATIENT)
Dept: FAMILY MEDICINE CLINIC | Facility: CLINIC | Age: 60
End: 2020-08-21

## 2020-08-21 DIAGNOSIS — E78.00 HYPERCHOLESTEROLEMIA: Primary | ICD-10-CM

## 2020-08-27 ENCOUNTER — LAB ENCOUNTER (OUTPATIENT)
Dept: LAB | Age: 60
End: 2020-08-27
Attending: FAMILY MEDICINE
Payer: COMMERCIAL

## 2020-08-27 DIAGNOSIS — E87.1 LOW SODIUM LEVELS: ICD-10-CM

## 2020-08-27 DIAGNOSIS — E78.00 HYPERCHOLESTEROLEMIA: ICD-10-CM

## 2020-08-27 LAB
ANION GAP SERPL CALC-SCNC: 5 MMOL/L (ref 0–18)
BUN BLD-MCNC: 11 MG/DL (ref 7–18)
BUN/CREAT SERPL: 16.7 (ref 10–20)
CALCIUM BLD-MCNC: 9.4 MG/DL (ref 8.5–10.1)
CHLORIDE SERPL-SCNC: 104 MMOL/L (ref 98–112)
CHOLEST SMN-MCNC: 222 MG/DL (ref ?–200)
CO2 SERPL-SCNC: 29 MMOL/L (ref 21–32)
CREAT BLD-MCNC: 0.66 MG/DL (ref 0.55–1.02)
GLUCOSE BLD-MCNC: 101 MG/DL (ref 70–99)
HDLC SERPL-MCNC: 43 MG/DL (ref 40–59)
LDLC SERPL CALC-MCNC: 150 MG/DL (ref ?–100)
NONHDLC SERPL-MCNC: 179 MG/DL (ref ?–130)
OSMOLALITY SERPL CALC.SUM OF ELEC: 286 MOSM/KG (ref 275–295)
PATIENT FASTING Y/N/NP: YES
PATIENT FASTING Y/N/NP: YES
POTASSIUM SERPL-SCNC: 4 MMOL/L (ref 3.5–5.1)
SODIUM SERPL-SCNC: 138 MMOL/L (ref 136–145)
TRIGL SERPL-MCNC: 145 MG/DL (ref 30–149)
VLDLC SERPL CALC-MCNC: 29 MG/DL (ref 0–30)

## 2020-08-27 PROCEDURE — 80061 LIPID PANEL: CPT | Performed by: FAMILY MEDICINE

## 2020-08-27 PROCEDURE — 80048 BASIC METABOLIC PNL TOTAL CA: CPT | Performed by: FAMILY MEDICINE

## 2020-08-27 PROCEDURE — 36415 COLL VENOUS BLD VENIPUNCTURE: CPT | Performed by: FAMILY MEDICINE

## 2020-09-02 ENCOUNTER — MED REC SCAN ONLY (OUTPATIENT)
Dept: FAMILY MEDICINE CLINIC | Facility: CLINIC | Age: 60
End: 2020-09-02

## 2020-09-05 RX ORDER — LEVOTHYROXINE SODIUM 88 UG/1
TABLET ORAL
Qty: 90 TABLET | Refills: 1 | Status: SHIPPED | OUTPATIENT
Start: 2020-09-05 | End: 2021-06-09

## 2020-09-05 NOTE — TELEPHONE ENCOUNTER
Last refill: 6/16/20 #90 w/ 1 refil  Last OV: 5/21/20 (telemed)  Last labs: 8/27/20    No future appointments.

## 2020-10-16 ENCOUNTER — TELEPHONE (OUTPATIENT)
Dept: FAMILY MEDICINE CLINIC | Facility: CLINIC | Age: 60
End: 2020-10-16

## 2020-10-16 NOTE — TELEPHONE ENCOUNTER
----- Message from Miguelangel Fischer sent at 10/16/2020  9:29 AM CDT -----  DR Tabby Storey RETURNED A CLL  [phone]

## 2020-10-16 NOTE — TELEPHONE ENCOUNTER
Uyen Odessa is calling her daughter was visiting from out of town and she just tested positive for Sultana Shanta is not having any symptoms but she would like to know what she should do.   Please advise

## 2020-10-16 NOTE — TELEPHONE ENCOUNTER
Patient reports her daughters all together with parents on Sunday, 10/11/2020. Her 1 daughter with mild sinus symptoms, she went back to [facility]. Was seen and tested covid positive on 10/14/2020.     Her daughter, Brandi Wright and family from [place] still staying wit

## 2020-10-17 ENCOUNTER — TELEPHONE (OUTPATIENT)
Dept: FAMILY MEDICINE CLINIC | Facility: CLINIC | Age: 60
End: 2020-10-17

## 2020-10-17 ENCOUNTER — HOSPITAL ENCOUNTER (OUTPATIENT)
Age: 60
Discharge: HOME | End: 2020-10-17
Payer: COMMERCIAL

## 2020-10-17 VITALS
BODY MASS INDEX: 25 KG/M2 | RESPIRATION RATE: 16 BRPM | HEART RATE: 52 BPM | TEMPERATURE: 97 F | DIASTOLIC BLOOD PRESSURE: 71 MMHG | OXYGEN SATURATION: 98 % | SYSTOLIC BLOOD PRESSURE: 145 MMHG | WEIGHT: 155 LBS

## 2020-10-17 DIAGNOSIS — Z20.822 EXPOSURE TO COVID-19 VIRUS: ICD-10-CM

## 2020-10-17 DIAGNOSIS — R05.9 COUGH: Primary | ICD-10-CM

## 2020-10-17 PROCEDURE — U0003 INFECTIOUS AGENT DETECTION BY NUCLEIC ACID (DNA OR RNA); SEVERE ACUTE RESPIRATORY SYNDROME CORONAVIRUS 2 (SARS-COV-2) (CORONAVIRUS DISEASE [COVID-19]), AMPLIFIED PROBE TECHNIQUE, MAKING USE OF HIGH THROUGHPUT TECHNOLOGIES AS DESCRIBED BY CMS-2020-01-R: HCPCS | Performed by: PHYSICIAN ASSISTANT

## 2020-10-17 PROCEDURE — 99213 OFFICE O/P EST LOW 20 MIN: CPT | Performed by: PHYSICIAN ASSISTANT

## 2020-10-17 NOTE — ED PROVIDER NOTES
Patient Seen in: [facility]      History   Patient presents with:  Testing  Cough/URI    Stated Complaint: cough, congestion, covid exposure    HPI    CHIEF COMPLAINT: Cough, runny nose, congestion, COVID-19 exposure    HISTORY OF PRESENT ILLN week      Frequency: Monthly or less      Drinks per session: 1 or 2    Drug use: No             Review of Systems    Positive for stated complaint: cough, congestion, covid exposure  Other systems are as noted in HPI.   Constitutional and vital signs revie admission on an emergency basis. Comprehensive verbal and written discharge and follow-up instructions were provided to help prevent relapse or worsening. I discussed the case with the patient and they had no questions, complaints, or concerns.   Patient

## 2020-10-17 NOTE — TELEPHONE ENCOUNTER
Radha Camarillo is calling she has some more questions about COVID, she spoke with Loki Bhatt yesterday, please call

## 2020-10-17 NOTE — TELEPHONE ENCOUNTER
Elier Smith wants to know if spouse and herself should get tested. They are both asymptomatic. Exposed over 1 week ago. Reinforced that pt does not necessarily need to get tested. If they want \"piece of mind\" they CAN get tested at any state site.      Daughter

## 2020-10-17 NOTE — TELEPHONE ENCOUNTER
Pt calling again, adamant about getting tested although she is asymptomatic and has been on quarantine. Exposure was over 1 week ago. Pt advised she can get tested through state sites.      Gave her the following options: [facility]

## 2020-10-19 ENCOUNTER — TELEPHONE (OUTPATIENT)
Dept: FAMILY MEDICINE CLINIC | Facility: CLINIC | Age: 60
End: 2020-10-19

## 2020-11-16 RX ORDER — LISINOPRIL AND HYDROCHLOROTHIAZIDE 12.5; 1 MG/1; MG/1
1 TABLET ORAL DAILY
Qty: 90 TABLET | Refills: 0 | Status: SHIPPED | OUTPATIENT
Start: 2020-11-16 | End: 2021-02-09

## 2020-11-16 NOTE — TELEPHONE ENCOUNTER
Last refill: 8/19/20 #90 w/ 0 refills    Last OV: 6/15/20  Last labs: 8/27/20    No future appointments.

## 2020-11-20 DIAGNOSIS — I10 ESSENTIAL HYPERTENSION: ICD-10-CM

## 2020-11-20 RX ORDER — NEBIVOLOL 10 MG/1
10 TABLET ORAL DAILY
Qty: 90 TABLET | Refills: 0 | Status: SHIPPED | OUTPATIENT
Start: 2020-11-20 | End: 2021-03-19

## 2021-02-01 ENCOUNTER — OFFICE VISIT (OUTPATIENT)
Dept: FAMILY MEDICINE CLINIC | Facility: CLINIC | Age: 61
End: 2021-02-01
Payer: COMMERCIAL

## 2021-02-01 ENCOUNTER — TELEPHONE (OUTPATIENT)
Dept: FAMILY MEDICINE CLINIC | Facility: CLINIC | Age: 61
End: 2021-02-01

## 2021-02-01 VITALS
OXYGEN SATURATION: 99 % | DIASTOLIC BLOOD PRESSURE: 78 MMHG | WEIGHT: 169 LBS | HEART RATE: 78 BPM | SYSTOLIC BLOOD PRESSURE: 138 MMHG | BODY MASS INDEX: 27.16 KG/M2 | HEIGHT: 66 IN | RESPIRATION RATE: 16 BRPM | TEMPERATURE: 98 F

## 2021-02-01 DIAGNOSIS — E78.00 HYPERCHOLESTEROLEMIA: ICD-10-CM

## 2021-02-01 DIAGNOSIS — I10 ESSENTIAL HYPERTENSION: ICD-10-CM

## 2021-02-01 DIAGNOSIS — N90.4 LICHEN SCLEROSUS OF FEMALE GENITALIA: Primary | ICD-10-CM

## 2021-02-01 DIAGNOSIS — N95.2 POST-MENOPAUSE ATROPHIC VAGINITIS: Primary | ICD-10-CM

## 2021-02-01 DIAGNOSIS — N94.10 FEMALE DYSPAREUNIA: ICD-10-CM

## 2021-02-01 PROCEDURE — 3008F BODY MASS INDEX DOCD: CPT | Performed by: NURSE PRACTITIONER

## 2021-02-01 PROCEDURE — 3075F SYST BP GE 130 - 139MM HG: CPT | Performed by: NURSE PRACTITIONER

## 2021-02-01 PROCEDURE — 3078F DIAST BP <80 MM HG: CPT | Performed by: NURSE PRACTITIONER

## 2021-02-01 PROCEDURE — 99213 OFFICE O/P EST LOW 20 MIN: CPT | Performed by: NURSE PRACTITIONER

## 2021-02-01 RX ORDER — ESTRADIOL 0.03 MG/D
1 FILM, EXTENDED RELEASE TRANSDERMAL
COMMUNITY
Start: 2021-01-11 | End: 2021-12-13

## 2021-02-01 RX ORDER — ESTRADIOL 4 UG/1
INSERT VAGINAL
Qty: 8 EACH | Refills: 0 | COMMUNITY
Start: 2021-02-01 | End: 2021-12-13

## 2021-02-01 NOTE — TELEPHONE ENCOUNTER
I reached out to her gynecologist to discuss vaginal atrophy and treatment plan. Currently on Imvexxy.

## 2021-02-01 NOTE — PROGRESS NOTES
HPI: HPI   Patient is here for painful intercourse. She has significant vaginal dryness. Saw Dr. Morrell in 9/2020 and was re-started on hormone therapy. She is currently on low dose Estradiol patch and intravaginal Imvexxy.  Symptoms persist. She has used E History    Tobacco Use      Smoking status: Never Smoker      Smokeless tobacco: Never Used    Alcohol use:  Yes      Alcohol/week: 7.0 - 14.0 standard drinks      Types: 7 - 14 Standard drinks or equivalent per week      Frequency: Monthly or less      Dri

## 2021-02-02 PROBLEM — N90.4 LICHEN SCLEROSUS OF FEMALE GENITALIA: Status: ACTIVE | Noted: 2021-02-02

## 2021-02-02 RX ORDER — CLOBETASOL PROPIONATE 0.5 MG/G
1 CREAM TOPICAL WEEKLY
Refills: 0 | COMMUNITY
Start: 2021-02-02

## 2021-02-02 RX ORDER — CLOBETASOL PROPIONATE 0.5 MG/G
1 CREAM TOPICAL 2 TIMES DAILY PRN
COMMUNITY
End: 2021-02-02

## 2021-02-02 NOTE — TELEPHONE ENCOUNTER
I talked with Dr. Jorge Godinez last night. Patient also has a history of suspected lichen sclerosus. Dr. Jorge Godinez recommended resuming Clobetasol cream and scheduling a vulvar bx with her in the next 1-2 months. Olivia Manolo know.  She has been using Clobetasol cream we

## 2021-02-09 RX ORDER — LISINOPRIL AND HYDROCHLOROTHIAZIDE 12.5; 1 MG/1; MG/1
1 TABLET ORAL DAILY
Qty: 90 TABLET | Refills: 0 | Status: SHIPPED | OUTPATIENT
Start: 2021-02-09 | End: 2021-05-10

## 2021-02-09 NOTE — TELEPHONE ENCOUNTER
Lisinopril-HCTZ: 11/16/20 #90 w/ 0 refills  Bystolic: 00/83/02 #79 w/ 0 refills    Last OV: 2/1/21  Last labs: 8/27/20    No future appointments. Due for fasting labs. [employer] message sent.     Warning popped up that Bystolic isn't covered under pt's insur

## 2021-03-19 ENCOUNTER — TELEPHONE (OUTPATIENT)
Dept: FAMILY MEDICINE CLINIC | Facility: CLINIC | Age: 61
End: 2021-03-19

## 2021-03-19 DIAGNOSIS — I10 ESSENTIAL HYPERTENSION: ICD-10-CM

## 2021-03-19 RX ORDER — NEBIVOLOL 10 MG/1
10 TABLET ORAL DAILY
Qty: 90 TABLET | Refills: 0 | Status: SHIPPED | OUTPATIENT
Start: 2021-03-19 | End: 2021-05-10

## 2021-03-19 NOTE — TELEPHONE ENCOUNTER
LAST FILLED 11/20/2020 #90  LAST SEEN 2/1/2021 WITH MELO  138/78- SHE didn't do labs, do you remember why?   VIRTUAL WITH MARIALUISA ON 5/21/2020

## 2021-03-19 NOTE — TELEPHONE ENCOUNTER
Rx filled. Please remind patient she is due for fasting labs.     Did she schedule follow up with Dr. Claire Brothers for atrophic vaginitis

## 2021-03-22 NOTE — TELEPHONE ENCOUNTER
Patient advised. States she will call back to schedule labs.     States she did schedule a follow up visit with Dr. Devaughn Whyte.

## 2021-05-10 DIAGNOSIS — I10 ESSENTIAL HYPERTENSION: ICD-10-CM

## 2021-05-10 RX ORDER — LISINOPRIL AND HYDROCHLOROTHIAZIDE 12.5; 1 MG/1; MG/1
1 TABLET ORAL DAILY
Qty: 90 TABLET | Refills: 0 | Status: SHIPPED | OUTPATIENT
Start: 2021-05-10 | End: 2021-07-01

## 2021-05-10 RX ORDER — NEBIVOLOL 10 MG/1
10 TABLET ORAL DAILY
Qty: 30 TABLET | Refills: 0 | Status: SHIPPED | OUTPATIENT
Start: 2021-05-10 | End: 2021-07-01

## 2021-05-10 NOTE — TELEPHONE ENCOUNTER
Last refill #90 on 3/19/2021  Last office visit pertaining to refill on 2/1/2021  No future appointments. BP Readings from Last 3 Encounters:  02/01/21 : 138/78  10/17/20 : 145/71  06/15/20 : 146/80    Labs were ordered on 2/1/2021 - not completed.   Willa

## 2021-05-10 NOTE — TELEPHONE ENCOUNTER
Hypertension Medications Protocol Ahqkej09/10/2021 05:59 AM   CMP or BMP in past 12 months Protocol Details    Last serum creatinine< 2.0     Appointment in past 6 or next 3 months      Last visit 2/1/2021      Last refill 2/9/2021 Lisinopril # 90     Last

## 2021-05-13 ENCOUNTER — LAB ENCOUNTER (OUTPATIENT)
Dept: LAB | Age: 61
End: 2021-05-13
Attending: FAMILY MEDICINE
Payer: COMMERCIAL

## 2021-05-13 DIAGNOSIS — E78.00 HYPERCHOLESTEROLEMIA: ICD-10-CM

## 2021-05-13 DIAGNOSIS — I10 ESSENTIAL HYPERTENSION: ICD-10-CM

## 2021-05-13 PROCEDURE — 80053 COMPREHEN METABOLIC PANEL: CPT | Performed by: NURSE PRACTITIONER

## 2021-05-13 PROCEDURE — 80061 LIPID PANEL: CPT | Performed by: NURSE PRACTITIONER

## 2021-06-10 RX ORDER — LEVOTHYROXINE SODIUM 88 UG/1
88 TABLET ORAL
Qty: 30 TABLET | Refills: 0 | Status: SHIPPED | OUTPATIENT
Start: 2021-06-10 | End: 2021-12-21

## 2021-06-10 NOTE — TELEPHONE ENCOUNTER
Last refill #90 x 1 on 9/5/2020  Last office visit pertaining to refill on 2/1/2021  No future appointments. Patient is due for labs - TSH  Reminder letter sent.

## 2021-06-11 ENCOUNTER — LAB ENCOUNTER (OUTPATIENT)
Dept: LAB | Age: 61
End: 2021-06-11
Attending: FAMILY MEDICINE
Payer: COMMERCIAL

## 2021-06-11 DIAGNOSIS — E03.9 HYPOTHYROIDISM, UNSPECIFIED TYPE: ICD-10-CM

## 2021-06-11 DIAGNOSIS — E03.9 HYPOTHYROIDISM, UNSPECIFIED TYPE: Primary | ICD-10-CM

## 2021-06-11 PROCEDURE — 84443 ASSAY THYROID STIM HORMONE: CPT | Performed by: FAMILY MEDICINE

## 2021-06-11 PROCEDURE — 84439 ASSAY OF FREE THYROXINE: CPT | Performed by: FAMILY MEDICINE

## 2021-06-30 DIAGNOSIS — T14.8XXA PUNCTURE WOUND: ICD-10-CM

## 2021-06-30 RX ORDER — LEVOFLOXACIN 500 MG/1
TABLET, FILM COATED ORAL
Qty: 7 TABLET | Refills: 0 | OUTPATIENT
Start: 2021-06-30

## 2021-06-30 RX ORDER — LEVOTHYROXINE SODIUM 88 UG/1
88 TABLET ORAL
Qty: 90 TABLET | Refills: 1 | Status: SHIPPED | OUTPATIENT
Start: 2021-06-30 | End: 2021-09-22

## 2021-06-30 RX ORDER — LEVOTHYROXINE SODIUM 88 UG/1
88 TABLET ORAL
Qty: 30 TABLET | Refills: 0 | Status: CANCELLED | OUTPATIENT
Start: 2021-06-30

## 2021-06-30 NOTE — TELEPHONE ENCOUNTER
LOV:2021    LAST LAB: 2021    LAST RX:     Medication Quantity Refills Start End   levofloxacin 500 MG Oral Tab () 7 tablet 0 6/15/2020 2020   Sig:   Take 1 tablet (500 mg total) by mouth daily for 7 days.          Next OV: No future ap

## 2021-06-30 NOTE — TELEPHONE ENCOUNTER
Patientoutreach: spoke with pt notified her she is due for office visit,  pt is requesting Levothyroxine sodium 88mcg for 90 tablet , pt is leaving [facility] in July .  Informed pt that I would send the request to Ange Rei and that Doctor may request to see her

## 2021-07-01 DIAGNOSIS — I10 ESSENTIAL HYPERTENSION: ICD-10-CM

## 2021-07-01 RX ORDER — LISINOPRIL AND HYDROCHLOROTHIAZIDE 12.5; 1 MG/1; MG/1
1 TABLET ORAL DAILY
Qty: 90 TABLET | Refills: 0 | Status: SHIPPED | OUTPATIENT
Start: 2021-07-01 | End: 2021-08-09

## 2021-07-01 RX ORDER — NEBIVOLOL 10 MG/1
10 TABLET ORAL DAILY
Qty: 30 TABLET | Refills: 2 | Status: SHIPPED | OUTPATIENT
Start: 2021-07-01 | End: 2021-09-18

## 2021-08-08 DIAGNOSIS — I10 ESSENTIAL HYPERTENSION: ICD-10-CM

## 2021-08-09 RX ORDER — LISINOPRIL AND HYDROCHLOROTHIAZIDE 12.5; 1 MG/1; MG/1
1 TABLET ORAL DAILY
Qty: 90 TABLET | Refills: 0 | Status: SHIPPED | OUTPATIENT
Start: 2021-08-09 | End: 2021-09-22

## 2021-08-20 ENCOUNTER — TELEPHONE (OUTPATIENT)
Dept: FAMILY MEDICINE CLINIC | Facility: CLINIC | Age: 61
End: 2021-08-20

## 2021-08-20 NOTE — TELEPHONE ENCOUNTER
Contacted patient regarding Mammogram report. Advised to recheck in one year. -andreina Dodson. Patient wants to know if she needs breast MRI in 6 months?

## 2021-08-21 NOTE — TELEPHONE ENCOUNTER
I called the patient and she advised that she does not have a specialist. She sees Dr Laquita Pabon and Dr Laquita Pabon had ordered an MRI but the patient was not able to get done within the auth time frame.      She will check with Dr Laquita Pabon again

## 2021-08-21 NOTE — TELEPHONE ENCOUNTER
Per review of the previous mammogram report from [facility] ( 08/13/2020) it is recommended that she have 6 month MRIs done in addition to annual screenings as she is in the higher risk category for breast cancer?

## 2021-09-18 ENCOUNTER — TELEPHONE (OUTPATIENT)
Dept: FAMILY MEDICINE CLINIC | Facility: CLINIC | Age: 61
End: 2021-09-18

## 2021-09-18 DIAGNOSIS — I10 ESSENTIAL HYPERTENSION: ICD-10-CM

## 2021-09-18 DIAGNOSIS — Z12.11 SCREENING FOR COLON CANCER: Primary | ICD-10-CM

## 2021-09-18 RX ORDER — NEBIVOLOL 10 MG/1
10 TABLET ORAL DAILY
Qty: 30 TABLET | Refills: 0 | Status: SHIPPED | OUTPATIENT
Start: 2021-09-18 | End: 2021-09-22

## 2021-09-18 NOTE — TELEPHONE ENCOUNTER
Last seen 2/1/2021 with APN  No future appointments.   Next lab due 6/14/2022    Future Appointments   Date Time Provider James Mejia   9/22/2021 10:30 AM Tessie Blizzard, DO [facility]

## 2021-09-18 NOTE — TELEPHONE ENCOUNTER
Nebivolol HCl (BYSTOLIC) 10 MG Oral Tab    PLEASE SEND REFILL TO [place], SHE WOULD LIKE A 90 DAY SUPPLY

## 2021-09-22 ENCOUNTER — OFFICE VISIT (OUTPATIENT)
Dept: FAMILY MEDICINE CLINIC | Facility: CLINIC | Age: 61
End: 2021-09-22
Payer: COMMERCIAL

## 2021-09-22 VITALS
WEIGHT: 176 LBS | SYSTOLIC BLOOD PRESSURE: 126 MMHG | RESPIRATION RATE: 18 BRPM | HEIGHT: 66 IN | OXYGEN SATURATION: 96 % | DIASTOLIC BLOOD PRESSURE: 80 MMHG | HEART RATE: 63 BPM | TEMPERATURE: 97 F | BODY MASS INDEX: 28.28 KG/M2

## 2021-09-22 DIAGNOSIS — E03.9 HYPOTHYROIDISM, UNSPECIFIED TYPE: ICD-10-CM

## 2021-09-22 DIAGNOSIS — E78.00 HYPERCHOLESTEROLEMIA: ICD-10-CM

## 2021-09-22 DIAGNOSIS — I10 ESSENTIAL HYPERTENSION: Primary | ICD-10-CM

## 2021-09-22 PROCEDURE — 3074F SYST BP LT 130 MM HG: CPT | Performed by: FAMILY MEDICINE

## 2021-09-22 PROCEDURE — 99214 OFFICE O/P EST MOD 30 MIN: CPT | Performed by: FAMILY MEDICINE

## 2021-09-22 PROCEDURE — 3008F BODY MASS INDEX DOCD: CPT | Performed by: FAMILY MEDICINE

## 2021-09-22 PROCEDURE — 3079F DIAST BP 80-89 MM HG: CPT | Performed by: FAMILY MEDICINE

## 2021-09-22 RX ORDER — NEBIVOLOL 10 MG/1
10 TABLET ORAL DAILY
Qty: 90 TABLET | Refills: 1 | Status: SHIPPED | OUTPATIENT
Start: 2021-09-22 | End: 2021-09-30

## 2021-09-22 RX ORDER — LISINOPRIL AND HYDROCHLOROTHIAZIDE 12.5; 1 MG/1; MG/1
1 TABLET ORAL DAILY
Qty: 90 TABLET | Refills: 1 | Status: SHIPPED | OUTPATIENT
Start: 2021-09-22

## 2021-09-22 NOTE — PROGRESS NOTES
Subjective:   Patient ID: Christal Hernandez is a 64year old female. BP stable  Feels good    HPI    History/Other:   Review of Systems   Constitutional: Negative for fatigue. Respiratory: Negative for cough and shortness of breath.     Cardiovascular: Nega lower leg: No edema. Left lower leg: No edema. Lymphadenopathy:      Cervical: No cervical adenopathy. Neurological:      Mental Status: She is alert.        /80 (BP Location: Right arm, Patient Position: Sitting, Cuff Size: adult)   Pulse 63

## 2021-09-24 ENCOUNTER — LAB ENCOUNTER (OUTPATIENT)
Dept: LAB | Age: 61
End: 2021-09-24
Attending: FAMILY MEDICINE
Payer: COMMERCIAL

## 2021-09-24 DIAGNOSIS — Z12.11 SCREENING FOR COLON CANCER: ICD-10-CM

## 2021-09-24 PROCEDURE — 82274 ASSAY TEST FOR BLOOD FECAL: CPT | Performed by: FAMILY MEDICINE

## 2021-09-25 LAB — HEMOCCULT STL QL: NEGATIVE

## 2021-09-30 DIAGNOSIS — I10 ESSENTIAL HYPERTENSION: ICD-10-CM

## 2021-09-30 RX ORDER — NEBIVOLOL 10 MG/1
TABLET ORAL
Qty: 30 TABLET | Refills: 0 | Status: SHIPPED | OUTPATIENT
Start: 2021-09-30 | End: 2021-12-13

## 2021-12-13 ENCOUNTER — OFFICE VISIT (OUTPATIENT)
Dept: FAMILY MEDICINE CLINIC | Facility: CLINIC | Age: 61
End: 2021-12-13
Payer: COMMERCIAL

## 2021-12-13 ENCOUNTER — LAB ENCOUNTER (OUTPATIENT)
Dept: LAB | Age: 61
End: 2021-12-13
Attending: FAMILY MEDICINE
Payer: COMMERCIAL

## 2021-12-13 VITALS
SYSTOLIC BLOOD PRESSURE: 120 MMHG | OXYGEN SATURATION: 97 % | HEIGHT: 66 IN | TEMPERATURE: 99 F | HEART RATE: 64 BPM | BODY MASS INDEX: 27.97 KG/M2 | DIASTOLIC BLOOD PRESSURE: 70 MMHG | RESPIRATION RATE: 16 BRPM | WEIGHT: 174 LBS

## 2021-12-13 DIAGNOSIS — E78.00 HYPERCHOLESTEROLEMIA: ICD-10-CM

## 2021-12-13 DIAGNOSIS — I10 ESSENTIAL HYPERTENSION: ICD-10-CM

## 2021-12-13 DIAGNOSIS — I10 ESSENTIAL HYPERTENSION: Primary | ICD-10-CM

## 2021-12-13 DIAGNOSIS — E03.9 HYPOTHYROIDISM, UNSPECIFIED TYPE: ICD-10-CM

## 2021-12-13 PROCEDURE — 3078F DIAST BP <80 MM HG: CPT | Performed by: FAMILY MEDICINE

## 2021-12-13 PROCEDURE — 80061 LIPID PANEL: CPT | Performed by: FAMILY MEDICINE

## 2021-12-13 PROCEDURE — 99214 OFFICE O/P EST MOD 30 MIN: CPT | Performed by: FAMILY MEDICINE

## 2021-12-13 PROCEDURE — 80053 COMPREHEN METABOLIC PANEL: CPT | Performed by: FAMILY MEDICINE

## 2021-12-13 PROCEDURE — 3074F SYST BP LT 130 MM HG: CPT | Performed by: FAMILY MEDICINE

## 2021-12-13 PROCEDURE — 3008F BODY MASS INDEX DOCD: CPT | Performed by: FAMILY MEDICINE

## 2021-12-13 RX ORDER — NEBIVOLOL 5 MG/1
5 TABLET ORAL DAILY
Qty: 90 TABLET | Refills: 0 | Status: SHIPPED | OUTPATIENT
Start: 2021-12-13

## 2021-12-13 NOTE — PROGRESS NOTES
Subjective:   Patient ID: Floresita Bang is a 64year old female. Patient has been monitoring blood pressure. Blood pressure been running low. Stress decreased. Questions whether needs to be on Bystolic anymore. Blood pressure running 115/65 range.   H /70   Pulse 64   Temp 98.5 °F (36.9 °C) (Temporal)   Resp 16   Ht 5' 6\" (1.676 m)   Wt 174 lb (78.9 kg)   SpO2 97%   BMI 28.08 kg/m²       Assessment & Plan:   Essential hypertension  (primary encounter diagnosis)  Hypercholesterolemia  Hypothyr

## 2021-12-13 NOTE — PATIENT INSTRUCTIONS
Continue with diet, exercise, weight loss. Will decrease to Bystolic to 5 mg daily. Monitor blood pressure. Call if blood pressure increases. Call with questions or problems. Discussed further weaning if doing well. Call with laboratory studies.

## 2021-12-14 DIAGNOSIS — I10 ESSENTIAL HYPERTENSION: ICD-10-CM

## 2021-12-14 DIAGNOSIS — E78.00 HYPERCHOLESTEROLEMIA: Primary | ICD-10-CM

## 2021-12-20 DIAGNOSIS — I10 ESSENTIAL HYPERTENSION: ICD-10-CM

## 2021-12-20 DIAGNOSIS — E78.00 HYPERCHOLESTEROLEMIA: Primary | ICD-10-CM

## 2021-12-21 RX ORDER — LEVOTHYROXINE SODIUM 88 UG/1
TABLET ORAL
Qty: 90 TABLET | Refills: 0 | Status: SHIPPED | OUTPATIENT
Start: 2021-12-21

## 2021-12-21 NOTE — TELEPHONE ENCOUNTER
Last refill: 06/10/21  Qty: 30  W/ 0 refills  Last ov: 12/13/21    Requested Prescriptions     Pending Prescriptions Disp Refills   • LEVOTHYROXINE 88 MCG Oral Tab [Pharmacy Med Name: LEVOTHYROXINE 0.088MG (88MCG) TAB] 90 tablet 0     Sig: TAKE 1 TABLET(88

## 2022-01-19 ENCOUNTER — OFFICE VISIT (OUTPATIENT)
Dept: FAMILY MEDICINE CLINIC | Facility: CLINIC | Age: 62
End: 2022-01-19
Payer: COMMERCIAL

## 2022-01-19 VITALS
RESPIRATION RATE: 18 BRPM | HEART RATE: 89 BPM | BODY MASS INDEX: 26.53 KG/M2 | SYSTOLIC BLOOD PRESSURE: 122 MMHG | DIASTOLIC BLOOD PRESSURE: 80 MMHG | OXYGEN SATURATION: 98 % | TEMPERATURE: 98 F | HEIGHT: 67 IN | WEIGHT: 169 LBS

## 2022-01-19 DIAGNOSIS — K21.9 GASTROESOPHAGEAL REFLUX DISEASE WITHOUT ESOPHAGITIS: Primary | ICD-10-CM

## 2022-01-19 DIAGNOSIS — I10 ESSENTIAL HYPERTENSION: ICD-10-CM

## 2022-01-19 PROCEDURE — 3008F BODY MASS INDEX DOCD: CPT | Performed by: FAMILY MEDICINE

## 2022-01-19 PROCEDURE — 99214 OFFICE O/P EST MOD 30 MIN: CPT | Performed by: FAMILY MEDICINE

## 2022-01-19 PROCEDURE — 3074F SYST BP LT 130 MM HG: CPT | Performed by: FAMILY MEDICINE

## 2022-01-19 PROCEDURE — 3079F DIAST BP 80-89 MM HG: CPT | Performed by: FAMILY MEDICINE

## 2022-01-19 NOTE — PATIENT INSTRUCTIONS
Discussed avoidance of NSAIDs, over indulgence alcohol, eating too close to bedtime, tomato based foods. Pepcid recommended as needed. If needing to take daily we will switch to proton pump inhibitor. Call with questions or problems.   Continue to [facility]

## 2022-01-19 NOTE — PROGRESS NOTES
Subjective:   Patient ID: Almas Johnson is a 64year old female. Blood pressure stable with decrease in dose of medication. Without problems. Patient with questions regarding bouts of heartburn. Questions her gallbladder.   Without nausea/vomiting/diar reflux disease without esophagitis  (primary encounter diagnosis)  Essential hypertension    No orders of the defined types were placed in this encounter.       Meds This Visit:  Requested Prescriptions      No prescriptions requested or ordered in this enc

## 2022-02-21 ENCOUNTER — TELEPHONE (OUTPATIENT)
Dept: FAMILY MEDICINE CLINIC | Facility: CLINIC | Age: 62
End: 2022-02-21

## 2022-02-21 RX ORDER — NEBIVOLOL 5 MG/1
5 TABLET ORAL DAILY
Qty: 30 TABLET | Refills: 2 | Status: SHIPPED | OUTPATIENT
Start: 2022-02-21 | End: 2022-02-28

## 2022-02-22 NOTE — TELEPHONE ENCOUNTER
Called [employer] and initiated prior auth process. She is faxing form for completion and return with recent labs and office notes.

## 2022-02-25 NOTE — TELEPHONE ENCOUNTER
PRIOR AUTH DENIED FOR GENERIC BYSTOLIC. PATIENT NOTIFIED. SHE HAS 15 TABS LEFT. ASKS IF SHE CAN COME OFF OF IT, AND JUST TAKE THE LISINOPRILHCT, AND MONITOR  B/P.  DOCTOR WILL ADDRESS ON Monday, AND WILL CALL HER BACK.

## 2022-02-28 RX ORDER — NEBIVOLOL 5 MG/1
5 TABLET ORAL EVERY OTHER DAY
Qty: 1 TABLET | Refills: 0 | COMMUNITY
Start: 2022-02-28 | End: 2022-03-29

## 2022-03-21 RX ORDER — LEVOTHYROXINE SODIUM 88 UG/1
TABLET ORAL
Qty: 90 TABLET | Refills: 0 | Status: SHIPPED | OUTPATIENT
Start: 2022-03-21

## 2022-03-21 NOTE — TELEPHONE ENCOUNTER
Thyroid Supplements Protocol Passed 03/21/2022 06:03 AM   Protocol Details  TSH test in past 12 months    TSH value between 0.350 and 5.500 IU/ml    Appointment in past 12 or next 3 months        Last office visit:  01/19/22  Last refill:  12/21/21  #90, no refills  Last tsh:  06/11/21    No future appointments.

## 2022-03-29 ENCOUNTER — OFFICE VISIT (OUTPATIENT)
Dept: FAMILY MEDICINE CLINIC | Facility: CLINIC | Age: 62
End: 2022-03-29
Payer: COMMERCIAL

## 2022-03-29 VITALS
SYSTOLIC BLOOD PRESSURE: 130 MMHG | BODY MASS INDEX: 26.68 KG/M2 | TEMPERATURE: 98 F | HEART RATE: 70 BPM | HEIGHT: 67 IN | WEIGHT: 170 LBS | DIASTOLIC BLOOD PRESSURE: 80 MMHG | RESPIRATION RATE: 16 BRPM

## 2022-03-29 DIAGNOSIS — I10 ESSENTIAL HYPERTENSION: Primary | ICD-10-CM

## 2022-03-29 PROCEDURE — 3079F DIAST BP 80-89 MM HG: CPT | Performed by: FAMILY MEDICINE

## 2022-03-29 PROCEDURE — 3075F SYST BP GE 130 - 139MM HG: CPT | Performed by: FAMILY MEDICINE

## 2022-03-29 PROCEDURE — 3008F BODY MASS INDEX DOCD: CPT | Performed by: FAMILY MEDICINE

## 2022-03-29 PROCEDURE — 99213 OFFICE O/P EST LOW 20 MIN: CPT | Performed by: FAMILY MEDICINE

## 2022-03-29 NOTE — PATIENT INSTRUCTIONS
Continue off Bystolic. Regular exercise. Diet  Call with questions or problems. Follow-up in 4 to 6 months.

## 2022-05-06 ENCOUNTER — TELEPHONE (OUTPATIENT)
Dept: FAMILY MEDICINE CLINIC | Facility: CLINIC | Age: 62
End: 2022-05-06

## 2022-05-06 NOTE — TELEPHONE ENCOUNTER
She has discussed with Dr Salvador Landa in past;  Has been going on since the fall.    Will get back to her on Monday, 5/9/2022

## 2022-05-09 NOTE — TELEPHONE ENCOUNTER
----- Message from [employer] sent at 5/9/2022 10:23 AM CDT -----  Regarding: return call  Pt is returning call.   [phone]

## 2022-05-20 ENCOUNTER — TELEPHONE (OUTPATIENT)
Dept: FAMILY MEDICINE CLINIC | Facility: CLINIC | Age: 62
End: 2022-05-20

## 2022-05-20 DIAGNOSIS — I10 ESSENTIAL HYPERTENSION: ICD-10-CM

## 2022-05-20 RX ORDER — LISINOPRIL AND HYDROCHLOROTHIAZIDE 12.5; 1 MG/1; MG/1
1 TABLET ORAL DAILY
Qty: 90 TABLET | Refills: 1 | Status: SHIPPED | OUTPATIENT
Start: 2022-05-20

## 2022-05-31 ENCOUNTER — OFFICE VISIT (OUTPATIENT)
Dept: SURGERY | Facility: CLINIC | Age: 62
End: 2022-05-31
Payer: COMMERCIAL

## 2022-05-31 VITALS — BODY MASS INDEX: 26.68 KG/M2 | HEART RATE: 99 BPM | WEIGHT: 170 LBS | HEIGHT: 67 IN | TEMPERATURE: 99 F

## 2022-05-31 DIAGNOSIS — R10.31 RIGHT LOWER QUADRANT ABDOMINAL PAIN: Primary | ICD-10-CM

## 2022-05-31 PROCEDURE — 99243 OFF/OP CNSLTJ NEW/EST LOW 30: CPT | Performed by: SURGERY

## 2022-05-31 PROCEDURE — 3008F BODY MASS INDEX DOCD: CPT | Performed by: SURGERY

## 2022-06-10 ENCOUNTER — HOSPITAL ENCOUNTER (OUTPATIENT)
Dept: CT IMAGING | Age: 62
Discharge: HOME | End: 2022-06-10
Attending: SURGERY
Payer: COMMERCIAL

## 2022-06-10 DIAGNOSIS — R10.31 RIGHT LOWER QUADRANT ABDOMINAL PAIN: ICD-10-CM

## 2022-06-10 LAB — CREAT BLD-MCNC: 0.7 MG/DL

## 2022-06-10 PROCEDURE — 82565 ASSAY OF CREATININE: CPT

## 2022-06-10 PROCEDURE — 72193 CT PELVIS W/DYE: CPT | Performed by: SURGERY

## 2022-06-20 ENCOUNTER — TELEPHONE (OUTPATIENT)
Dept: FAMILY MEDICINE CLINIC | Facility: CLINIC | Age: 62
End: 2022-06-20

## 2022-06-20 NOTE — TELEPHONE ENCOUNTER
Last seen 3/29/2022  Due now for fasting labs and TSH with FREE T4    No future appointments. Future Appointments   Date Time Provider James Mejia   6/21/2022  8:30 AM REF [facility] REF [facility] Ref Lab Cathryne Scheuermann       Will hold off on refilling thyroid medicine until lab work back.

## 2022-06-21 ENCOUNTER — LABORATORY ENCOUNTER (OUTPATIENT)
Dept: LAB | Age: 62
End: 2022-06-21
Attending: FAMILY MEDICINE
Payer: COMMERCIAL

## 2022-06-21 DIAGNOSIS — E03.9 HYPOTHYROIDISM, UNSPECIFIED TYPE: ICD-10-CM

## 2022-06-21 DIAGNOSIS — I10 ESSENTIAL HYPERTENSION: ICD-10-CM

## 2022-06-21 DIAGNOSIS — E78.00 HYPERCHOLESTEROLEMIA: ICD-10-CM

## 2022-06-21 LAB
ALBUMIN SERPL-MCNC: 4.1 G/DL (ref 3.4–5)
ALBUMIN/GLOB SERPL: 1.1 {RATIO} (ref 1–2)
ALP LIVER SERPL-CCNC: 94 U/L
ALT SERPL-CCNC: 29 U/L
ANION GAP SERPL CALC-SCNC: 8 MMOL/L (ref 0–18)
AST SERPL-CCNC: 19 U/L (ref 15–37)
BILIRUB SERPL-MCNC: 0.6 MG/DL (ref 0.1–2)
BUN BLD-MCNC: 11 MG/DL (ref 7–18)
CALCIUM BLD-MCNC: 10.3 MG/DL (ref 8.5–10.1)
CHLORIDE SERPL-SCNC: 103 MMOL/L (ref 98–112)
CHOLEST SERPL-MCNC: 269 MG/DL (ref ?–200)
CO2 SERPL-SCNC: 27 MMOL/L (ref 21–32)
CREAT BLD-MCNC: 0.82 MG/DL
FASTING PATIENT LIPID ANSWER: YES
FASTING STATUS PATIENT QL REPORTED: YES
GLOBULIN PLAS-MCNC: 3.7 G/DL (ref 2.8–4.4)
GLUCOSE BLD-MCNC: 120 MG/DL (ref 70–99)
HDLC SERPL-MCNC: 41 MG/DL (ref 40–59)
LDLC SERPL CALC-MCNC: 169 MG/DL (ref ?–100)
NONHDLC SERPL-MCNC: 228 MG/DL (ref ?–130)
OSMOLALITY SERPL CALC.SUM OF ELEC: 287 MOSM/KG (ref 275–295)
POTASSIUM SERPL-SCNC: 3.6 MMOL/L (ref 3.5–5.1)
PROT SERPL-MCNC: 7.8 G/DL (ref 6.4–8.2)
SODIUM SERPL-SCNC: 138 MMOL/L (ref 136–145)
T4 FREE SERPL-MCNC: 1.2 NG/DL (ref 0.8–1.7)
TRIGL SERPL-MCNC: 310 MG/DL (ref 30–149)
TSI SER-ACNC: 2.59 MIU/ML (ref 0.36–3.74)
VLDLC SERPL CALC-MCNC: 63 MG/DL (ref 0–30)

## 2022-06-21 PROCEDURE — 80061 LIPID PANEL: CPT | Performed by: FAMILY MEDICINE

## 2022-06-21 PROCEDURE — 80053 COMPREHEN METABOLIC PANEL: CPT | Performed by: FAMILY MEDICINE

## 2022-06-21 PROCEDURE — 84443 ASSAY THYROID STIM HORMONE: CPT | Performed by: FAMILY MEDICINE

## 2022-06-21 PROCEDURE — 84439 ASSAY OF FREE THYROXINE: CPT | Performed by: FAMILY MEDICINE

## 2022-06-22 ENCOUNTER — LABORATORY ENCOUNTER (OUTPATIENT)
Dept: LAB | Age: 62
End: 2022-06-22
Attending: FAMILY MEDICINE
Payer: COMMERCIAL

## 2022-06-22 DIAGNOSIS — R73.9 ELEVATED BLOOD SUGAR: ICD-10-CM

## 2022-06-22 DIAGNOSIS — E03.9 HYPOTHYROIDISM, UNSPECIFIED TYPE: ICD-10-CM

## 2022-06-22 DIAGNOSIS — I10 ESSENTIAL HYPERTENSION: Primary | ICD-10-CM

## 2022-06-22 DIAGNOSIS — E78.00 HYPERCHOLESTEROLEMIA: ICD-10-CM

## 2022-06-22 PROCEDURE — 83036 HEMOGLOBIN GLYCOSYLATED A1C: CPT | Performed by: FAMILY MEDICINE

## 2022-06-23 LAB
EST. AVERAGE GLUCOSE BLD GHB EST-MCNC: 123 MG/DL (ref 68–126)
HBA1C MFR BLD: 5.9 % (ref ?–5.7)

## 2022-06-27 ENCOUNTER — TELEPHONE (OUTPATIENT)
Dept: FAMILY MEDICINE CLINIC | Facility: CLINIC | Age: 62
End: 2022-06-27

## 2022-06-27 DIAGNOSIS — E03.9 HYPOTHYROIDISM, UNSPECIFIED TYPE: Primary | ICD-10-CM

## 2022-06-27 RX ORDER — LEVOTHYROXINE SODIUM 88 UG/1
88 TABLET ORAL
Qty: 90 TABLET | Refills: 3 | Status: SHIPPED | OUTPATIENT
Start: 2022-06-27

## 2022-08-15 DIAGNOSIS — K40.90 UNILATERAL INGUINAL HERNIA WITHOUT OBSTRUCTION OR GANGRENE, RECURRENCE NOT SPECIFIED: Primary | ICD-10-CM

## 2022-08-17 DIAGNOSIS — K40.90 UNILATERAL INGUINAL HERNIA WITHOUT OBSTRUCTION OR GANGRENE, RECURRENCE NOT SPECIFIED: Primary | ICD-10-CM

## 2022-08-19 ENCOUNTER — TELEPHONE (OUTPATIENT)
Dept: CASE MANAGEMENT | Age: 62
End: 2022-08-19

## 2022-08-19 DIAGNOSIS — Z12.11 COLON CANCER SCREENING: Primary | ICD-10-CM

## 2022-09-12 ENCOUNTER — TELEPHONE (OUTPATIENT)
Facility: LOCATION | Age: 62
End: 2022-09-12

## 2022-09-13 ENCOUNTER — OFFICE VISIT (OUTPATIENT)
Dept: FAMILY MEDICINE CLINIC | Facility: CLINIC | Age: 62
End: 2022-09-13
Payer: COMMERCIAL

## 2022-09-13 ENCOUNTER — LAB ENCOUNTER (OUTPATIENT)
Dept: LAB | Age: 62
End: 2022-09-13
Attending: FAMILY MEDICINE
Payer: COMMERCIAL

## 2022-09-13 VITALS
SYSTOLIC BLOOD PRESSURE: 120 MMHG | BODY MASS INDEX: 25.9 KG/M2 | RESPIRATION RATE: 16 BRPM | OXYGEN SATURATION: 98 % | WEIGHT: 165 LBS | HEART RATE: 66 BPM | TEMPERATURE: 98 F | DIASTOLIC BLOOD PRESSURE: 70 MMHG | HEIGHT: 67 IN

## 2022-09-13 DIAGNOSIS — I10 ESSENTIAL HYPERTENSION: ICD-10-CM

## 2022-09-13 DIAGNOSIS — E78.00 HYPERCHOLESTEROLEMIA: ICD-10-CM

## 2022-09-13 DIAGNOSIS — R73.9 HYPERGLYCEMIA: ICD-10-CM

## 2022-09-13 DIAGNOSIS — Z01.818 PRE-OP EVALUATION: Primary | ICD-10-CM

## 2022-09-13 DIAGNOSIS — E03.9 HYPOTHYROIDISM, UNSPECIFIED TYPE: ICD-10-CM

## 2022-09-13 DIAGNOSIS — Z01.818 PRE-OP EVALUATION: ICD-10-CM

## 2022-09-13 DIAGNOSIS — K40.90 INGUINAL HERNIA, RIGHT: ICD-10-CM

## 2022-09-13 LAB
ALBUMIN SERPL-MCNC: 3.7 G/DL (ref 3.4–5)
ALBUMIN/GLOB SERPL: 1 {RATIO} (ref 1–2)
ALP LIVER SERPL-CCNC: 102 U/L
ALT SERPL-CCNC: 28 U/L
ANION GAP SERPL CALC-SCNC: 5 MMOL/L (ref 0–18)
AST SERPL-CCNC: 23 U/L (ref 15–37)
BASOPHILS # BLD AUTO: 0.05 X10(3) UL (ref 0–0.2)
BASOPHILS NFR BLD AUTO: 0.8 %
BILIRUB SERPL-MCNC: 0.5 MG/DL (ref 0.1–2)
BUN BLD-MCNC: 12 MG/DL (ref 7–18)
CALCIUM BLD-MCNC: 9.8 MG/DL (ref 8.5–10.1)
CHLORIDE SERPL-SCNC: 105 MMOL/L (ref 98–112)
CO2 SERPL-SCNC: 29 MMOL/L (ref 21–32)
CREAT BLD-MCNC: 0.72 MG/DL
EOSINOPHIL # BLD AUTO: 0.11 X10(3) UL (ref 0–0.7)
EOSINOPHIL NFR BLD AUTO: 1.7 %
ERYTHROCYTE [DISTWIDTH] IN BLOOD BY AUTOMATED COUNT: 12.7 %
EST. AVERAGE GLUCOSE BLD GHB EST-MCNC: 117 MG/DL (ref 68–126)
FASTING STATUS PATIENT QL REPORTED: NO
GFR SERPLBLD BASED ON 1.73 SQ M-ARVRAT: 94 ML/MIN/1.73M2 (ref 60–?)
GLOBULIN PLAS-MCNC: 3.8 G/DL (ref 2.8–4.4)
GLUCOSE BLD-MCNC: 107 MG/DL (ref 70–99)
HBA1C MFR BLD: 5.7 % (ref ?–5.7)
HCT VFR BLD AUTO: 42.8 %
HGB BLD-MCNC: 14.1 G/DL
IMM GRANULOCYTES # BLD AUTO: 0.02 X10(3) UL (ref 0–1)
IMM GRANULOCYTES NFR BLD: 0.3 %
LYMPHOCYTES # BLD AUTO: 1.73 X10(3) UL (ref 1–4)
LYMPHOCYTES NFR BLD AUTO: 27.2 %
MCH RBC QN AUTO: 30.2 PG (ref 26–34)
MCHC RBC AUTO-ENTMCNC: 32.9 G/DL (ref 31–37)
MCV RBC AUTO: 91.6 FL
MONOCYTES # BLD AUTO: 0.65 X10(3) UL (ref 0.1–1)
MONOCYTES NFR BLD AUTO: 10.2 %
NEUTROPHILS # BLD AUTO: 3.81 X10 (3) UL (ref 1.5–7.7)
NEUTROPHILS # BLD AUTO: 3.81 X10(3) UL (ref 1.5–7.7)
NEUTROPHILS NFR BLD AUTO: 59.8 %
OSMOLALITY SERPL CALC.SUM OF ELEC: 288 MOSM/KG (ref 275–295)
PLATELET # BLD AUTO: 276 10(3)UL (ref 150–450)
POTASSIUM SERPL-SCNC: 3.7 MMOL/L (ref 3.5–5.1)
PROT SERPL-MCNC: 7.5 G/DL (ref 6.4–8.2)
RBC # BLD AUTO: 4.67 X10(6)UL
SODIUM SERPL-SCNC: 139 MMOL/L (ref 136–145)
WBC # BLD AUTO: 6.4 X10(3) UL (ref 4–11)

## 2022-09-13 PROCEDURE — 85025 COMPLETE CBC W/AUTO DIFF WBC: CPT | Performed by: FAMILY MEDICINE

## 2022-09-13 PROCEDURE — 3008F BODY MASS INDEX DOCD: CPT | Performed by: FAMILY MEDICINE

## 2022-09-13 PROCEDURE — 3074F SYST BP LT 130 MM HG: CPT | Performed by: FAMILY MEDICINE

## 2022-09-13 PROCEDURE — 83036 HEMOGLOBIN GLYCOSYLATED A1C: CPT | Performed by: FAMILY MEDICINE

## 2022-09-13 PROCEDURE — 99214 OFFICE O/P EST MOD 30 MIN: CPT | Performed by: FAMILY MEDICINE

## 2022-09-13 PROCEDURE — 93000 ELECTROCARDIOGRAM COMPLETE: CPT | Performed by: FAMILY MEDICINE

## 2022-09-13 PROCEDURE — 3078F DIAST BP <80 MM HG: CPT | Performed by: FAMILY MEDICINE

## 2022-09-13 PROCEDURE — 80053 COMPREHEN METABOLIC PANEL: CPT | Performed by: FAMILY MEDICINE

## 2022-09-14 NOTE — PAT NURSING NOTE
Received contraindication alert to tylenol in anesthesia pre procedure order set due to patient's allergy to amoxicillin. Spoke with Rama Lozada in pharmacy, states not a true allergy.

## 2022-09-15 ENCOUNTER — TELEPHONE (OUTPATIENT)
Facility: LOCATION | Age: 62
End: 2022-09-15

## 2022-09-15 NOTE — TELEPHONE ENCOUNTER
Patient is scheduled for surgery on Monday. She states her  tested positive for COVID yesterday. She continues to remain asymptomatic and was tested for COVID yesterday, which was negative. This RN spoke with PAT and was told she can proceed with her scheduled COVID testing for surgery. Patient was instructed to isolate from her  and go for COVID testing for surgery as planned. Patient instructed to notify the office (or on call physician over the weekend) if she develops symptoms or tests positive for COVID between now and surgery. Patient verbalized understanding.

## 2022-09-16 ENCOUNTER — LAB ENCOUNTER (OUTPATIENT)
Dept: LAB | Age: 62
End: 2022-09-16
Attending: SURGERY

## 2022-09-16 ENCOUNTER — TELEPHONE (OUTPATIENT)
Facility: LOCATION | Age: 62
End: 2022-09-16

## 2022-09-16 DIAGNOSIS — Z20.822 ENCOUNTER FOR PREOPERATIVE SCREENING LABORATORY TESTING FOR COVID-19 VIRUS: ICD-10-CM

## 2022-09-16 DIAGNOSIS — Z01.812 ENCOUNTER FOR PREOPERATIVE SCREENING LABORATORY TESTING FOR COVID-19 VIRUS: ICD-10-CM

## 2022-09-16 DIAGNOSIS — K40.90 INGUINAL HERNIA WITHOUT OBSTRUCTION OR GANGRENE, RECURRENCE NOT SPECIFIED, UNSPECIFIED LATERALITY: Primary | ICD-10-CM

## 2022-09-16 NOTE — TELEPHONE ENCOUNTER
Patient called to r/s SX due to testing positive positive for COVID today. Patient reports headache, chills, and congestion. Patient has rescheduled 9/19 procedure to 10/31.

## 2022-09-17 LAB — SARS-COV-2 RNA RESP QL NAA+PROBE: DETECTED

## 2022-09-27 ENCOUNTER — TELEPHONE (OUTPATIENT)
Dept: FAMILY MEDICINE CLINIC | Facility: CLINIC | Age: 62
End: 2022-09-27

## 2022-09-27 DIAGNOSIS — Z12.11 SCREENING FOR COLON CANCER: Primary | ICD-10-CM

## 2022-09-27 DIAGNOSIS — Z12.31 ENCOUNTER FOR SCREENING MAMMOGRAM FOR BREAST CANCER: Primary | ICD-10-CM

## 2022-09-27 NOTE — TELEPHONE ENCOUNTER
PT WOULD LIKE MAMMO ORDERED-  DOES SHE NEED TO BE SEEN OR CAN ONE BE PLACED-    PLEASE ADVISE-THANK YOU

## 2022-09-29 ENCOUNTER — LABORATORY ENCOUNTER (OUTPATIENT)
Dept: LAB | Age: 62
End: 2022-09-29
Attending: FAMILY MEDICINE

## 2022-09-29 DIAGNOSIS — Z12.11 SCREENING FOR COLON CANCER: ICD-10-CM

## 2022-09-29 LAB — HEMOCCULT STL QL: NEGATIVE

## 2022-09-29 PROCEDURE — 82274 ASSAY TEST FOR BLOOD FECAL: CPT | Performed by: FAMILY MEDICINE

## 2022-10-19 ENCOUNTER — TELEPHONE (OUTPATIENT)
Dept: FAMILY MEDICINE CLINIC | Facility: CLINIC | Age: 62
End: 2022-10-19

## 2022-10-19 DIAGNOSIS — Z12.39 BREAST CANCER SCREENING, HIGH RISK PATIENT: Primary | ICD-10-CM

## 2022-10-19 DIAGNOSIS — Z80.3 FAMILY HISTORY OF BREAST CANCER IN SISTER: ICD-10-CM

## 2022-10-19 DIAGNOSIS — Z80.3 FAMILY HISTORY OF BREAST CANCER IN MOTHER: ICD-10-CM

## 2022-10-19 NOTE — TELEPHONE ENCOUNTER
Report recommends screening MRI of breasts at 6 month interval with annual screening mammography.   Mammogram done on 10/18/2022 at [facility] for [address] in [place]     Call [facility] in morning regarding if has done genetic testing- BRACCA gene testing

## 2022-10-25 NOTE — TELEPHONE ENCOUNTER
PATIENT DID HAVE THE BRACCA GENE TEST AND WAS NEGATIVE. IT WAS ORDERED BY DR ROONEY.  SHE WILL GET THE REPORT SO CAN HAVE IN HER CHART AT [place]      MRI OF BREASTS DUE AROUND 4/18/2023.   WILL PUT PT REMINDER IN FOR MARCH 1, 2023- TO PLACE ORDER FOR 4/18/2023

## 2022-10-31 ENCOUNTER — HOSPITAL ENCOUNTER (OUTPATIENT)
Facility: HOSPITAL | Age: 62
Setting detail: HOSPITAL OUTPATIENT SURGERY
Discharge: HOME | End: 2022-10-31
Attending: SURGERY | Admitting: SURGERY
Payer: COMMERCIAL

## 2022-10-31 ENCOUNTER — ANESTHESIA (OUTPATIENT)
Dept: SURGERY | Facility: HOSPITAL | Age: 62
End: 2022-10-31
Payer: COMMERCIAL

## 2022-10-31 ENCOUNTER — ANESTHESIA EVENT (OUTPATIENT)
Dept: SURGERY | Facility: HOSPITAL | Age: 62
End: 2022-10-31
Payer: COMMERCIAL

## 2022-10-31 VITALS
TEMPERATURE: 98 F | SYSTOLIC BLOOD PRESSURE: 137 MMHG | HEART RATE: 67 BPM | BODY MASS INDEX: 25.58 KG/M2 | RESPIRATION RATE: 18 BRPM | OXYGEN SATURATION: 96 % | WEIGHT: 163 LBS | HEIGHT: 67 IN | DIASTOLIC BLOOD PRESSURE: 74 MMHG

## 2022-10-31 DIAGNOSIS — Z20.822 ENCOUNTER FOR PREOPERATIVE SCREENING LABORATORY TESTING FOR COVID-19 VIRUS: Primary | ICD-10-CM

## 2022-10-31 DIAGNOSIS — Z01.812 ENCOUNTER FOR PREOPERATIVE SCREENING LABORATORY TESTING FOR COVID-19 VIRUS: Primary | ICD-10-CM

## 2022-10-31 PROCEDURE — 0YU54JZ SUPPLEMENT RIGHT INGUINAL REGION WITH SYNTHETIC SUBSTITUTE, PERCUTANEOUS ENDOSCOPIC APPROACH: ICD-10-PCS | Performed by: SURGERY

## 2022-10-31 DEVICE — BARD MESH
Type: IMPLANTABLE DEVICE | Status: FUNCTIONAL
Brand: BARD MESH

## 2022-10-31 RX ORDER — MEPERIDINE HYDROCHLORIDE 25 MG/ML
12.5 INJECTION INTRAMUSCULAR; INTRAVENOUS; SUBCUTANEOUS AS NEEDED
Status: DISCONTINUED | OUTPATIENT
Start: 2022-10-31 | End: 2022-10-31

## 2022-10-31 RX ORDER — TRAMADOL HYDROCHLORIDE 50 MG/1
50 TABLET ORAL EVERY 6 HOURS PRN
Qty: 15 TABLET | Refills: 0 | Status: SHIPPED | OUTPATIENT
Start: 2022-10-31

## 2022-10-31 RX ORDER — HEPARIN SODIUM 5000 [USP'U]/ML
5000 INJECTION, SOLUTION INTRAVENOUS; SUBCUTANEOUS ONCE
Status: COMPLETED | OUTPATIENT
Start: 2022-10-31 | End: 2022-10-31

## 2022-10-31 RX ORDER — HYDROMORPHONE HYDROCHLORIDE 1 MG/ML
0.2 INJECTION, SOLUTION INTRAMUSCULAR; INTRAVENOUS; SUBCUTANEOUS EVERY 5 MIN PRN
Status: DISCONTINUED | OUTPATIENT
Start: 2022-10-31 | End: 2022-10-31

## 2022-10-31 RX ORDER — ROCURONIUM BROMIDE 10 MG/ML
INJECTION, SOLUTION INTRAVENOUS AS NEEDED
Status: DISCONTINUED | OUTPATIENT
Start: 2022-10-31 | End: 2022-10-31 | Stop reason: SURG

## 2022-10-31 RX ORDER — SCOLOPAMINE TRANSDERMAL SYSTEM 1 MG/1
1 PATCH, EXTENDED RELEASE TRANSDERMAL ONCE
Status: DISCONTINUED | OUTPATIENT
Start: 2022-10-31 | End: 2022-10-31

## 2022-10-31 RX ORDER — ACETAMINOPHEN 500 MG
1000 TABLET ORAL ONCE
Status: DISCONTINUED | OUTPATIENT
Start: 2022-10-31 | End: 2022-10-31 | Stop reason: HOSPADM

## 2022-10-31 RX ORDER — ACETAMINOPHEN 500 MG
1000 TABLET ORAL ONCE AS NEEDED
Status: DISCONTINUED | OUTPATIENT
Start: 2022-10-31 | End: 2022-10-31

## 2022-10-31 RX ORDER — LABETALOL HYDROCHLORIDE 5 MG/ML
5 INJECTION, SOLUTION INTRAVENOUS EVERY 5 MIN PRN
Status: DISCONTINUED | OUTPATIENT
Start: 2022-10-31 | End: 2022-10-31

## 2022-10-31 RX ORDER — NEOSTIGMINE METHYLSULFATE 1 MG/ML
INJECTION, SOLUTION INTRAVENOUS AS NEEDED
Status: DISCONTINUED | OUTPATIENT
Start: 2022-10-31 | End: 2022-10-31 | Stop reason: SURG

## 2022-10-31 RX ORDER — MIDAZOLAM HYDROCHLORIDE 1 MG/ML
1 INJECTION INTRAMUSCULAR; INTRAVENOUS EVERY 5 MIN PRN
Status: DISCONTINUED | OUTPATIENT
Start: 2022-10-31 | End: 2022-10-31

## 2022-10-31 RX ORDER — SODIUM CHLORIDE, SODIUM LACTATE, POTASSIUM CHLORIDE, CALCIUM CHLORIDE 600; 310; 30; 20 MG/100ML; MG/100ML; MG/100ML; MG/100ML
INJECTION, SOLUTION INTRAVENOUS CONTINUOUS
Status: DISCONTINUED | OUTPATIENT
Start: 2022-10-31 | End: 2022-10-31

## 2022-10-31 RX ORDER — KETOROLAC TROMETHAMINE 30 MG/ML
INJECTION, SOLUTION INTRAMUSCULAR; INTRAVENOUS AS NEEDED
Status: DISCONTINUED | OUTPATIENT
Start: 2022-10-31 | End: 2022-10-31 | Stop reason: SURG

## 2022-10-31 RX ORDER — OXYCODONE HYDROCHLORIDE 5 MG/1
5 TABLET ORAL ONCE AS NEEDED
Status: DISCONTINUED | OUTPATIENT
Start: 2022-10-31 | End: 2022-10-31

## 2022-10-31 RX ORDER — GLYCOPYRROLATE 0.2 MG/ML
INJECTION, SOLUTION INTRAMUSCULAR; INTRAVENOUS AS NEEDED
Status: DISCONTINUED | OUTPATIENT
Start: 2022-10-31 | End: 2022-10-31 | Stop reason: SURG

## 2022-10-31 RX ORDER — PROCHLORPERAZINE EDISYLATE 5 MG/ML
5 INJECTION INTRAMUSCULAR; INTRAVENOUS EVERY 8 HOURS PRN
Status: DISCONTINUED | OUTPATIENT
Start: 2022-10-31 | End: 2022-10-31

## 2022-10-31 RX ORDER — HYDROMORPHONE HYDROCHLORIDE 1 MG/ML
INJECTION, SOLUTION INTRAMUSCULAR; INTRAVENOUS; SUBCUTANEOUS
Status: COMPLETED
Start: 2022-10-31 | End: 2022-10-31

## 2022-10-31 RX ORDER — TRAMADOL HYDROCHLORIDE 50 MG/1
50 TABLET ORAL ONCE AS NEEDED
Status: DISCONTINUED | OUTPATIENT
Start: 2022-10-31 | End: 2022-10-31

## 2022-10-31 RX ORDER — HYDROMORPHONE HYDROCHLORIDE 1 MG/ML
0.6 INJECTION, SOLUTION INTRAMUSCULAR; INTRAVENOUS; SUBCUTANEOUS EVERY 5 MIN PRN
Status: DISCONTINUED | OUTPATIENT
Start: 2022-10-31 | End: 2022-10-31

## 2022-10-31 RX ORDER — BUPIVACAINE HYDROCHLORIDE AND EPINEPHRINE 5; 5 MG/ML; UG/ML
INJECTION, SOLUTION EPIDURAL; INTRACAUDAL; PERINEURAL AS NEEDED
Status: DISCONTINUED | OUTPATIENT
Start: 2022-10-31 | End: 2022-10-31

## 2022-10-31 RX ORDER — ONDANSETRON 2 MG/ML
4 INJECTION INTRAMUSCULAR; INTRAVENOUS EVERY 6 HOURS PRN
Status: DISCONTINUED | OUTPATIENT
Start: 2022-10-31 | End: 2022-10-31

## 2022-10-31 RX ORDER — NALOXONE HYDROCHLORIDE 0.4 MG/ML
80 INJECTION, SOLUTION INTRAMUSCULAR; INTRAVENOUS; SUBCUTANEOUS AS NEEDED
Status: DISCONTINUED | OUTPATIENT
Start: 2022-10-31 | End: 2022-10-31

## 2022-10-31 RX ORDER — HYDROMORPHONE HYDROCHLORIDE 1 MG/ML
0.4 INJECTION, SOLUTION INTRAMUSCULAR; INTRAVENOUS; SUBCUTANEOUS EVERY 5 MIN PRN
Status: DISCONTINUED | OUTPATIENT
Start: 2022-10-31 | End: 2022-10-31

## 2022-10-31 RX ORDER — LIDOCAINE HYDROCHLORIDE 10 MG/ML
INJECTION, SOLUTION EPIDURAL; INFILTRATION; INTRACAUDAL; PERINEURAL AS NEEDED
Status: DISCONTINUED | OUTPATIENT
Start: 2022-10-31 | End: 2022-10-31 | Stop reason: SURG

## 2022-10-31 RX ORDER — SCOLOPAMINE TRANSDERMAL SYSTEM 1 MG/1
1 PATCH, EXTENDED RELEASE TRANSDERMAL ONCE
Status: DISCONTINUED | OUTPATIENT
Start: 2022-10-31 | End: 2022-10-31 | Stop reason: HOSPADM

## 2022-10-31 RX ORDER — ONDANSETRON 2 MG/ML
INJECTION INTRAMUSCULAR; INTRAVENOUS AS NEEDED
Status: DISCONTINUED | OUTPATIENT
Start: 2022-10-31 | End: 2022-10-31 | Stop reason: SURG

## 2022-10-31 RX ORDER — DIPHENHYDRAMINE HYDROCHLORIDE 50 MG/ML
12.5 INJECTION INTRAMUSCULAR; INTRAVENOUS AS NEEDED
Status: DISCONTINUED | OUTPATIENT
Start: 2022-10-31 | End: 2022-10-31

## 2022-10-31 RX ORDER — DEXAMETHASONE SODIUM PHOSPHATE 4 MG/ML
VIAL (ML) INJECTION AS NEEDED
Status: DISCONTINUED | OUTPATIENT
Start: 2022-10-31 | End: 2022-10-31 | Stop reason: SURG

## 2022-10-31 RX ORDER — CEFAZOLIN SODIUM/WATER 2 G/20 ML
SYRINGE (ML) INTRAVENOUS AS NEEDED
Status: DISCONTINUED | OUTPATIENT
Start: 2022-10-31 | End: 2022-10-31 | Stop reason: SURG

## 2022-10-31 RX ORDER — CLINDAMYCIN PHOSPHATE 900 MG/50ML
900 INJECTION INTRAVENOUS ONCE
Status: DISCONTINUED | OUTPATIENT
Start: 2022-10-31 | End: 2022-10-31 | Stop reason: HOSPADM

## 2022-10-31 RX ADMIN — ROCURONIUM BROMIDE 30 MG: 10 INJECTION, SOLUTION INTRAVENOUS at 07:37:00

## 2022-10-31 RX ADMIN — CEFAZOLIN SODIUM/WATER 2 G: 2 G/20 ML SYRINGE (ML) INTRAVENOUS at 07:45:00

## 2022-10-31 RX ADMIN — GLYCOPYRROLATE 0.8 MG: 0.2 INJECTION, SOLUTION INTRAMUSCULAR; INTRAVENOUS at 08:21:00

## 2022-10-31 RX ADMIN — SODIUM CHLORIDE, SODIUM LACTATE, POTASSIUM CHLORIDE, CALCIUM CHLORIDE: 600; 310; 30; 20 INJECTION, SOLUTION INTRAVENOUS at 08:27:00

## 2022-10-31 RX ADMIN — ONDANSETRON 4 MG: 2 INJECTION INTRAMUSCULAR; INTRAVENOUS at 08:21:00

## 2022-10-31 RX ADMIN — LIDOCAINE HYDROCHLORIDE 100 MG: 10 INJECTION, SOLUTION EPIDURAL; INFILTRATION; INTRACAUDAL; PERINEURAL at 07:37:00

## 2022-10-31 RX ADMIN — KETOROLAC TROMETHAMINE 30 MG: 30 INJECTION, SOLUTION INTRAMUSCULAR; INTRAVENOUS at 07:37:00

## 2022-10-31 RX ADMIN — SODIUM CHLORIDE, SODIUM LACTATE, POTASSIUM CHLORIDE, CALCIUM CHLORIDE: 600; 310; 30; 20 INJECTION, SOLUTION INTRAVENOUS at 07:34:00

## 2022-10-31 RX ADMIN — ROCURONIUM BROMIDE 10 MG: 10 INJECTION, SOLUTION INTRAVENOUS at 08:02:00

## 2022-10-31 RX ADMIN — NEOSTIGMINE METHYLSULFATE 5 MG: 1 INJECTION, SOLUTION INTRAVENOUS at 08:21:00

## 2022-10-31 RX ADMIN — DEXAMETHASONE SODIUM PHOSPHATE 8 MG: 4 MG/ML VIAL (ML) INJECTION at 07:37:00

## 2022-10-31 NOTE — OPERATIVE REPORT
[address]    PATIENT'S NAME: Annie Jauregui   ATTENDING PHYSICIAN: Katiuska Menezes D.O.   OPERATING PHYSICIAN: Katiuska Menezes D.O.   PATIENT ACCOUNT#:   [de-identified]    LOCATION:  [address] #:   [id number]       YOB: 1960  ADMISSION DATE:       10/31/2022      OPERATION DATE:  10/31/2022    OPERATIVE REPORT      PREOPERATIVE DIAGNOSIS:  Right inguinal hernia. POSTOPERATIVE DIAGNOSIS:  Right inguinal hernia, indirect. PROCEDURE:  Right inguinal herniorrhaphy with mesh. ASSISTANT:  Hilda Tapia PA-C    ANESTHESIA:  General.    COMPLICATIONS:  None. OPERATIVE TECHNIQUE:  An informed consent was previously obtained. The patient was taken the operative suite and placed in the supine position. General inhalational anesthetic was administered by the anesthesia department, and the anterior abdomen was prepped and draped in the usual sterile fashion. Gonzalez catheterization performed by nursing staff. The abdomen was entered at subumbilical position using standard Zo technique. The patient had some loose omental adhesions to the right midquadrant of the abdomen. However, a 5 mm trocar was placed under laparoscopic visualization. Also, a 5 mm trocar placed left mid quadrant under laparoscopic visualization. Patient placed in Trendelenburg position. The peritoneum 4 cm cephalad to the internal ring was grasped and tented posteriorly and incised in a transverse fashion to the level of the umbilical ligament. The preperitoneal space was dissected down to the level of the pubic tubercle, and then along the anterior pubic ramus. There was no evidence of direct hernial defect or femoral hernia. Attention was then directed toward the internal ring where the transversalis fascia was incised. The patient had a lipomatous structure extending into the internal ring, and this was grasped and dissected posteriorly back into the abdominal cavity.   A 3 x 5 Prolene mesh was then placed within the abdominal cavity. It was tacked along the anterior pubic ramus, to the posterior rectus musculature and lateral to the internal ring. The posterior peritoneum was then brought in apposition to the anterior peritoneum and tacked at 1 cm intervals. Careful inspection was carried out of the 5 mm trocar site at the right lateral abdomen demonstrating no evidence of injury to intra-abdominal contents. Trocars were then removed under reduced pneumoperitoneum. The umbilicus was approximated at fascial level using running 0 Vicryl suture. Skin edges approximated using 4-0 Monocryl in a running subdermal fashion. Overlying Mastisol and Steri-Strips were applied. Then, 20 mL of 0.5% Marcaine plain injected into the incisions at the completion of the procedure. The patient was transported from the operative suite to Recovery in stable condition.     Dictated By Ayaz Simmons D.O.  d: 10/31/2022 08:34:37  t: 10/31/2022 18:11:31  Job [id number]  WP/    cc: Carmina Bean D.O.

## 2022-10-31 NOTE — ANESTHESIA PROCEDURE NOTES
Airway  Date/Time: 10/31/2022 7:38 AM  Urgency: elective    Airway not difficult    General Information and Staff    Patient location during procedure: OR  Anesthesiologist: Magdalena Cazares MD  Performed: anesthesiologist     Indications and Patient Condition  Indications for airway management: anesthesia  Sedation level: deep  Preoxygenated: yes  Patient position: sniffing  Mask difficulty assessment: 1 - vent by mask    Final Airway Details  Final airway type: endotracheal airway      Successful airway: ETT  Cuffed: yes   Successful intubation technique: direct laryngoscopy  Facilitating devices/methods: intubating stylet and cricoid pressure  Endotracheal tube insertion site: oral  Blade: Christiane  Blade size: #3  ETT size (mm): 7.0    Cormack-Lehane Classification: grade IIA - partial view of glottis  Placement verified by: chest auscultation and capnometry   Measured from: teeth  ETT to teeth (cm): 21  Number of attempts at approach: 1  Number of other approaches attempted: 0    Additional Comments  Easy intubation. No evidence of facial, dental, or oral trauma.     Son Darling MD  [facility]

## 2022-11-09 ENCOUNTER — OFFICE VISIT (OUTPATIENT)
Facility: LOCATION | Age: 62
End: 2022-11-09

## 2022-11-09 VITALS — TEMPERATURE: 98 F | HEART RATE: 81 BPM

## 2022-11-09 DIAGNOSIS — Z98.890 POST-OPERATIVE STATE: Primary | ICD-10-CM

## 2023-02-04 DIAGNOSIS — I10 ESSENTIAL HYPERTENSION: ICD-10-CM

## 2023-02-06 RX ORDER — LISINOPRIL AND HYDROCHLOROTHIAZIDE 12.5; 1 MG/1; MG/1
1 TABLET ORAL DAILY
Qty: 90 TABLET | Refills: 1 | Status: SHIPPED | OUTPATIENT
Start: 2023-02-06

## 2023-03-01 ENCOUNTER — TELEPHONE (OUTPATIENT)
Dept: FAMILY MEDICINE CLINIC | Facility: CLINIC | Age: 63
End: 2023-03-01

## 2023-03-01 DIAGNOSIS — Z80.3 FAMILY HISTORY OF BREAST CANCER IN MOTHER: ICD-10-CM

## 2023-03-01 DIAGNOSIS — Z80.3 FAMILY HISTORY OF BREAST CANCER IN SISTER: ICD-10-CM

## 2023-03-01 DIAGNOSIS — Z12.39 BREAST CANCER SCREENING, HIGH RISK PATIENT: Primary | ICD-10-CM

## 2023-03-23 NOTE — TELEPHONE ENCOUNTER
Patient will stay with [facility] for MRI of breast w/ w/o W CAD bilateral breasts. Order placed and faxed to [facility] scheduling.     Will watch for authorized MRI; then will need to fax to [facility]

## 2023-03-30 NOTE — TELEPHONE ENCOUNTER
Patient gives correct fax # [phone] for [facility] MRI of breasts. Order faxed.   Still not authorized by insurance; will route to [facility] team

## 2023-04-04 ENCOUNTER — TELEPHONE (OUTPATIENT)
Dept: FAMILY MEDICINE CLINIC | Facility: CLINIC | Age: 63
End: 2023-04-04

## 2023-04-04 NOTE — TELEPHONE ENCOUNTER
The order for the breast cancer screening needs to be faxed to [address] [place] [address].   She said that this is the correct fax number

## 2023-04-25 NOTE — TELEPHONE ENCOUNTER
Staff message sent to [facility] Team - to update the status of the prior authorization for this MRI.

## 2023-05-05 ENCOUNTER — TELEPHONE (OUTPATIENT)
Dept: FAMILY MEDICINE CLINIC | Facility: CLINIC | Age: 63
End: 2023-05-05

## 2023-05-05 ENCOUNTER — MED REC SCAN ONLY (OUTPATIENT)
Dept: FAMILY MEDICINE CLINIC | Facility: CLINIC | Age: 63
End: 2023-05-05

## 2023-05-05 NOTE — TELEPHONE ENCOUNTER
Received MRI breast results from [facility]       Pt notified of normal breast MRI and recommendation to recheck bilateral screening mammogram 10/2023 and MRI in one year.      Pt verbalized understanding and states is already scheduled for mammogram.    Pt reminder entered for MRI follow up

## 2023-06-14 DIAGNOSIS — E03.9 HYPOTHYROIDISM, UNSPECIFIED TYPE: ICD-10-CM

## 2023-06-14 RX ORDER — LEVOTHYROXINE SODIUM 88 UG/1
TABLET ORAL
Qty: 90 TABLET | Refills: 3 | Status: SHIPPED | OUTPATIENT
Start: 2023-06-14

## 2023-06-28 DIAGNOSIS — I10 ESSENTIAL HYPERTENSION: ICD-10-CM

## 2023-06-28 RX ORDER — LISINOPRIL AND HYDROCHLOROTHIAZIDE 12.5; 1 MG/1; MG/1
1 TABLET ORAL DAILY
Qty: 90 TABLET | Refills: 1 | Status: SHIPPED | OUTPATIENT
Start: 2023-06-28

## 2023-07-06 ENCOUNTER — LABORATORY ENCOUNTER (OUTPATIENT)
Dept: LAB | Age: 63
End: 2023-07-06
Attending: FAMILY MEDICINE
Payer: COMMERCIAL

## 2023-07-06 ENCOUNTER — OFFICE VISIT (OUTPATIENT)
Dept: FAMILY MEDICINE CLINIC | Facility: CLINIC | Age: 63
End: 2023-07-06
Payer: COMMERCIAL

## 2023-07-06 VITALS
WEIGHT: 161 LBS | HEART RATE: 75 BPM | BODY MASS INDEX: 25.27 KG/M2 | RESPIRATION RATE: 19 BRPM | SYSTOLIC BLOOD PRESSURE: 140 MMHG | HEIGHT: 67 IN | DIASTOLIC BLOOD PRESSURE: 82 MMHG | OXYGEN SATURATION: 97 % | TEMPERATURE: 99 F

## 2023-07-06 DIAGNOSIS — I10 PRIMARY HYPERTENSION: ICD-10-CM

## 2023-07-06 DIAGNOSIS — N90.4 LICHEN SCLEROSUS OF FEMALE GENITALIA: ICD-10-CM

## 2023-07-06 DIAGNOSIS — N95.2 VAGINAL ATROPHY: ICD-10-CM

## 2023-07-06 DIAGNOSIS — E03.9 HYPOTHYROIDISM, UNSPECIFIED TYPE: ICD-10-CM

## 2023-07-06 DIAGNOSIS — Z13.220 LIPID SCREENING: ICD-10-CM

## 2023-07-06 DIAGNOSIS — Z00.00 WELLNESS EXAMINATION: ICD-10-CM

## 2023-07-06 DIAGNOSIS — Z00.00 WELLNESS EXAMINATION: Primary | ICD-10-CM

## 2023-07-06 DIAGNOSIS — R73.09 ELEVATED GLUCOSE: ICD-10-CM

## 2023-07-06 DIAGNOSIS — Z12.11 COLON CANCER SCREENING: ICD-10-CM

## 2023-07-06 PROBLEM — M54.50 ACUTE LEFT-SIDED LOW BACK PAIN WITHOUT SCIATICA: Status: ACTIVE | Noted: 2022-08-23

## 2023-07-06 LAB
ALBUMIN SERPL-MCNC: 4 G/DL (ref 3.4–5)
ALBUMIN/GLOB SERPL: 1 {RATIO} (ref 1–2)
ALP LIVER SERPL-CCNC: 101 U/L
ALT SERPL-CCNC: 33 U/L
ANION GAP SERPL CALC-SCNC: 7 MMOL/L (ref 0–18)
AST SERPL-CCNC: 29 U/L (ref 15–37)
BILIRUB SERPL-MCNC: 0.6 MG/DL (ref 0.1–2)
BUN BLD-MCNC: 10 MG/DL (ref 7–18)
CALCIUM BLD-MCNC: 10 MG/DL (ref 8.5–10.1)
CHLORIDE SERPL-SCNC: 102 MMOL/L (ref 98–112)
CHOLEST SERPL-MCNC: 268 MG/DL (ref ?–200)
CO2 SERPL-SCNC: 27 MMOL/L (ref 21–32)
CREAT BLD-MCNC: 0.74 MG/DL
EST. AVERAGE GLUCOSE BLD GHB EST-MCNC: 120 MG/DL (ref 68–126)
FASTING PATIENT LIPID ANSWER: YES
FASTING STATUS PATIENT QL REPORTED: YES
GFR SERPLBLD BASED ON 1.73 SQ M-ARVRAT: 91 ML/MIN/1.73M2 (ref 60–?)
GLOBULIN PLAS-MCNC: 3.9 G/DL (ref 2.8–4.4)
GLUCOSE BLD-MCNC: 120 MG/DL (ref 70–99)
HBA1C MFR BLD: 5.8 % (ref ?–5.7)
HDLC SERPL-MCNC: 45 MG/DL (ref 40–59)
LDLC SERPL CALC-MCNC: 183 MG/DL (ref ?–100)
NONHDLC SERPL-MCNC: 223 MG/DL (ref ?–130)
OSMOLALITY SERPL CALC.SUM OF ELEC: 282 MOSM/KG (ref 275–295)
POTASSIUM SERPL-SCNC: 3.9 MMOL/L (ref 3.5–5.1)
PROT SERPL-MCNC: 7.9 G/DL (ref 6.4–8.2)
SODIUM SERPL-SCNC: 136 MMOL/L (ref 136–145)
TRIGL SERPL-MCNC: 212 MG/DL (ref 30–149)
TSI SER-ACNC: 1.42 MIU/ML (ref 0.36–3.74)
VLDLC SERPL CALC-MCNC: 44 MG/DL (ref 0–30)

## 2023-07-06 PROCEDURE — 99396 PREV VISIT EST AGE 40-64: CPT

## 2023-07-06 PROCEDURE — 83036 HEMOGLOBIN GLYCOSYLATED A1C: CPT

## 2023-07-06 PROCEDURE — 3008F BODY MASS INDEX DOCD: CPT

## 2023-07-06 PROCEDURE — 80053 COMPREHEN METABOLIC PANEL: CPT

## 2023-07-06 PROCEDURE — 80061 LIPID PANEL: CPT

## 2023-07-06 PROCEDURE — 84443 ASSAY THYROID STIM HORMONE: CPT

## 2023-07-06 PROCEDURE — 3079F DIAST BP 80-89 MM HG: CPT

## 2023-07-06 PROCEDURE — 3077F SYST BP >= 140 MM HG: CPT

## 2023-07-06 RX ORDER — CONJUGATED ESTROGENS 0.62 MG/G
0.5 CREAM VAGINAL
Qty: 30 G | Refills: 2 | Status: SHIPPED | OUTPATIENT
Start: 2023-07-06 | End: 2023-08-05

## 2023-09-18 DIAGNOSIS — I10 ESSENTIAL HYPERTENSION: ICD-10-CM

## 2023-09-18 RX ORDER — LISINOPRIL AND HYDROCHLOROTHIAZIDE 12.5; 1 MG/1; MG/1
1 TABLET ORAL DAILY
Qty: 90 TABLET | Refills: 1 | OUTPATIENT
Start: 2023-09-18

## 2023-10-20 ENCOUNTER — TELEPHONE (OUTPATIENT)
Dept: FAMILY MEDICINE CLINIC | Facility: CLINIC | Age: 63
End: 2023-10-20

## 2023-10-20 NOTE — TELEPHONE ENCOUNTER
PC to pt. Pt had wellness exam with Nisha Carroll NP in 7/6/23. Is inquiring which colon cancer screening EJ prefers for her to do. Notified EJ not in office on Friday, will get message to him to address when back in office.

## 2023-10-21 NOTE — TELEPHONE ENCOUNTER
Spoke with patient and she verbalized understanding. Cologuard form completed and faxed to [employer].

## 2023-10-31 ENCOUNTER — TELEPHONE (OUTPATIENT)
Dept: FAMILY MEDICINE CLINIC | Facility: CLINIC | Age: 63
End: 2023-10-31

## 2023-10-31 NOTE — TELEPHONE ENCOUNTER
Detailed message left that mammogram on 10/20/2023 is negative; recheck in 1 year. Also MRI of breast due 4/28/2024. Will call her loser to date when due .

## 2023-11-08 ENCOUNTER — TELEPHONE (OUTPATIENT)
Dept: FAMILY MEDICINE CLINIC | Facility: CLINIC | Age: 63
End: 2023-11-08

## 2023-11-16 ENCOUNTER — OFFICE VISIT (OUTPATIENT)
Dept: FAMILY MEDICINE CLINIC | Facility: CLINIC | Age: 63
End: 2023-11-16
Payer: COMMERCIAL

## 2023-11-16 VITALS
RESPIRATION RATE: 18 BRPM | WEIGHT: 161 LBS | HEART RATE: 82 BPM | SYSTOLIC BLOOD PRESSURE: 134 MMHG | OXYGEN SATURATION: 98 % | HEIGHT: 67 IN | BODY MASS INDEX: 25.27 KG/M2 | DIASTOLIC BLOOD PRESSURE: 80 MMHG | TEMPERATURE: 98 F

## 2023-11-16 DIAGNOSIS — S20.212A RIB CONTUSION, LEFT, INITIAL ENCOUNTER: ICD-10-CM

## 2023-11-16 DIAGNOSIS — M79.601 PAIN OF RIGHT UPPER EXTREMITY: Primary | ICD-10-CM
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
## 2023-11-16 PROCEDURE — 3075F SYST BP GE 130 - 139MM HG: CPT | Performed by: FAMILY MEDICINE

## 2023-11-16 PROCEDURE — 3079F DIAST BP 80-89 MM HG: CPT | Performed by: FAMILY MEDICINE

## 2023-11-16 PROCEDURE — 3008F BODY MASS INDEX DOCD: CPT | Performed by: FAMILY MEDICINE

## 2023-11-16 PROCEDURE — 99214 OFFICE O/P EST MOD 30 MIN: CPT | Performed by: FAMILY MEDICINE

## 2024-01-10 NOTE — TELEPHONE ENCOUNTER
"Subjective     Niyah Stacy is a 2 y.o. female who presents with No chief complaint on file.    Niyah Stacy is an established patient who presents with father who provides history for today's visit.     Pt presents today with cough, congestion and fever. Pt had had these symptoms for 3 days. Cough is described as congested. + fevers of 101F starting last night. No difficulty breathing. No complaining of sore throat and ear pain.     Pt is tolerating PO fluids with normal urine output.     : Yes  Sick Contacts: Yes - was at a birthday party with people who later tested + for flu A.   OTC medications used: Tylenol  & Motrin       Review of Systems   Constitutional:  Positive for fever.   HENT:  Positive for congestion. Negative for ear pain and sore throat.    Eyes:  Negative for discharge and redness.   Respiratory:  Positive for cough. Negative for shortness of breath and wheezing.    Gastrointestinal:  Negative for diarrhea and vomiting.   Skin:  Negative for rash.   All other systems reviewed and are negative.       Objective     Pulse 130   Temp 36.7 °C (98 °F) (Temporal)   Resp 32   Ht 0.864 m (2' 10\")   Wt 12.7 kg (28 lb)   SpO2 97%   BMI 17.03 kg/m²      Physical Exam  Constitutional:       General: She is active.      Appearance: Normal appearance. She is well-developed.   HENT:      Head: Normocephalic.      Right Ear: Tympanic membrane is erythematous and bulging.      Left Ear: There is impacted cerumen.      Nose: Congestion present.      Mouth/Throat:      Mouth: Mucous membranes are moist.      Pharynx: Oropharynx is clear. No oropharyngeal exudate or posterior oropharyngeal erythema.   Eyes:      Conjunctiva/sclera: Conjunctivae normal.      Pupils: Pupils are equal, round, and reactive to light.   Cardiovascular:      Rate and Rhythm: Regular rhythm.      Heart sounds: Normal heart sounds.   Pulmonary:      Effort: Pulmonary effort is normal. No respiratory distress " Last refill: 09/22/21  Qty: 90  W/ 1 refills  Last ov: 09/22/21    Requested Prescriptions     Pending Prescriptions Disp Refills   • nebivolol 10 MG Oral Tab [Pharmacy Med Name: NEBIVOLOL 10MG TABLETS] 30 tablet 0     Sig: TAKE 1 TABLET(10 MG) BY MOUTH DA or retractions.      Breath sounds: No decreased air movement. Rales present. No wheezing.   Musculoskeletal:      Cervical back: Normal range of motion.   Lymphadenopathy:      Cervical: No cervical adenopathy.   Skin:     General: Skin is warm and dry.      Capillary Refill: Capillary refill takes less than 2 seconds.   Neurological:      General: No focal deficit present.      Mental Status: She is alert.       Assessment & Plan     1. Non-recurrent acute suppurative otitis media of right ear without spontaneous rupture of tympanic membrane  - Provided parent & patient with information on the etiology & pathogenesis of otitis media.    - Given age and nature of infection we will start Cefdinir at this time.  It is essential that you give your child the entire course of the medicine.  -May give Tylenol/Motrin prn discomfort. Do NOT give aspirin. May apply warm compress to the ear for prn discomfort.   -RTC in 2 weeks for reevaluation. Please call if there is no improvement in symptoms after approximately 3 days.     - cefdinir (OMNICEF) 250 MG/5ML suspension; Take 1.8 mL by mouth 2 times a day for 7 days.  Dispense: 25.2 mL; Refill: 0    2. RSV (acute bronchiolitis due to respiratory syncytial virus)  Patient is well appearing, not hypoxic, and well hydrated with no increased work of breathing.     1. Pathogenesis of viral infections (RSV) discussed including typical length (5-10 days) and natural progression.  - Viral URIs usually last 5-10 days.  Symptoms peak in severity at 3 or 5 days and then improve and disappear over the next 7 to 10 days. Treatment includes symptoms management and supportive care.   2. Symptomatic care discussed at length including:   Nasal suctioning with saline  Encouraging fluids  Hylands/Honey for cough (If over 1 year)   Humidifier  Warm showers/baths to help loosen secretions  May prefer to sleep at incline  Tylenol & Motrin dosing provided and reviewed. Do NOT give your child  aspirin.   3. Strict return precautions given, discussed red flags such as new/continued fevers, increased WOB, using muscles around ribs to breath, increase in RR, wheezing, etc. Monitor hydration status/PO intake and number of wet diapers.  RTC/ER if these symptoms occur.     - POCT CEPHEID COV-2, FLU A/B, RSV - PCR: RSV+

## 2024-03-04 ENCOUNTER — TELEPHONE (OUTPATIENT)
Dept: FAMILY MEDICINE CLINIC | Facility: CLINIC | Age: 64
End: 2024-03-04

## 2024-03-04 DIAGNOSIS — E78.00 HYPERCHOLESTEROLEMIA: ICD-10-CM

## 2024-03-04 DIAGNOSIS — Z12.39 BREAST CANCER SCREENING, HIGH RISK PATIENT: ICD-10-CM

## 2024-03-04 DIAGNOSIS — R73.9 HYPERGLYCEMIA: Primary | ICD-10-CM

## 2024-03-04 NOTE — TELEPHONE ENCOUNTER
Patient notified is coming up due for MRI of breasts she will call to schedule; and will need labwork done about 2 weeks prior to MRI.  Lab orders in chart

## 2024-03-04 NOTE — TELEPHONE ENCOUNTER
MRI of both breasts  Received: 3 days ago  Kath Ambrocio, RN  Agusto Hernandez Nurse  Due 4/28/2024; put order in and call patient  Goes to [facility] for advanced Imaging.  [address] [place]  [address]        [phone]

## 2024-03-05 ENCOUNTER — TELEPHONE (OUTPATIENT)
Dept: FAMILY MEDICINE CLINIC | Facility: CLINIC | Age: 64
End: 2024-03-05

## 2024-03-05 DIAGNOSIS — Z12.39 BREAST CANCER SCREENING, HIGH RISK PATIENT: ICD-10-CM

## 2024-03-05 DIAGNOSIS — R92.333 HETEROGENEOUSLY DENSE TISSUE OF BOTH BREASTS ON MAMMOGRAPHY: ICD-10-CM

## 2024-03-05 DIAGNOSIS — Z80.3 FAMILY HISTORY OF BREAST CANCER: Primary | ICD-10-CM

## 2024-03-05 NOTE — TELEPHONE ENCOUNTER
Yesi from [facility] called.  She needs the order for MRI changed.  She needs diagnosis code changed to Z80.3.  There is a family history of breast cancer.  Fax:  [phone]

## 2024-03-28 ENCOUNTER — TELEPHONE (OUTPATIENT)
Dept: FAMILY MEDICINE CLINIC | Facility: CLINIC | Age: 64
End: 2024-03-28

## 2024-03-28 DIAGNOSIS — I10 ESSENTIAL HYPERTENSION: ICD-10-CM

## 2024-03-28 DIAGNOSIS — E03.9 HYPOTHYROIDISM, UNSPECIFIED TYPE: Primary | ICD-10-CM

## 2024-03-28 RX ORDER — LISINOPRIL AND HYDROCHLOROTHIAZIDE 12.5; 1 MG/1; MG/1
1 TABLET ORAL DAILY
Qty: 90 TABLET | Refills: 1 | Status: SHIPPED | OUTPATIENT
Start: 2024-03-28

## 2024-03-28 NOTE — TELEPHONE ENCOUNTER
PT REQUESTING REFILL ON LISINOPRIL-HYDROCHLOROTHIAZIDE 10-12.5 MG Oral Tab.     [facility] [place].

## 2024-03-28 NOTE — TELEPHONE ENCOUNTER
Last seen 11/16/2023    Due now for fasting lab    Future Appointments   Date Time Provider Department Center   4/3/2024  8:30 AM REF [facility] REF [facility] Ref [facility]

## 2024-04-03 ENCOUNTER — LABORATORY ENCOUNTER (OUTPATIENT)
Dept: LAB | Age: 64
End: 2024-04-03
Attending: FAMILY MEDICINE
Payer: COMMERCIAL

## 2024-04-03 DIAGNOSIS — E03.9 HYPOTHYROIDISM, UNSPECIFIED TYPE: ICD-10-CM

## 2024-04-03 DIAGNOSIS — R73.9 HYPERGLYCEMIA: ICD-10-CM

## 2024-04-03 DIAGNOSIS — E78.00 HYPERCHOLESTEROLEMIA: ICD-10-CM

## 2024-04-03 LAB
ALBUMIN SERPL-MCNC: 4.1 G/DL (ref 3.4–5)
ALBUMIN/GLOB SERPL: 1.1 {RATIO} (ref 1–2)
ALP LIVER SERPL-CCNC: 95 U/L
ALT SERPL-CCNC: 47 U/L
ANION GAP SERPL CALC-SCNC: 4 MMOL/L (ref 0–18)
AST SERPL-CCNC: 36 U/L (ref 15–37)
BILIRUB SERPL-MCNC: 0.5 MG/DL (ref 0.1–2)
BUN BLD-MCNC: 10 MG/DL (ref 9–23)
CALCIUM BLD-MCNC: 9.6 MG/DL (ref 8.5–10.1)
CHLORIDE SERPL-SCNC: 104 MMOL/L (ref 98–112)
CHOLEST SERPL-MCNC: 321 MG/DL (ref ?–200)
CO2 SERPL-SCNC: 30 MMOL/L (ref 21–32)
CREAT BLD-MCNC: 0.77 MG/DL
EGFRCR SERPLBLD CKD-EPI 2021: 87 ML/MIN/1.73M2 (ref 60–?)
EST. AVERAGE GLUCOSE BLD GHB EST-MCNC: 126 MG/DL (ref 68–126)
FASTING PATIENT LIPID ANSWER: YES
FASTING STATUS PATIENT QL REPORTED: YES
GLOBULIN PLAS-MCNC: 3.7 G/DL (ref 2.8–4.4)
GLUCOSE BLD-MCNC: 109 MG/DL (ref 70–99)
HBA1C MFR BLD: 6 % (ref ?–5.7)
HDLC SERPL-MCNC: 45 MG/DL (ref 40–59)
LDLC SERPL CALC-MCNC: 209 MG/DL (ref ?–100)
NONHDLC SERPL-MCNC: 276 MG/DL (ref ?–130)
OSMOLALITY SERPL CALC.SUM OF ELEC: 286 MOSM/KG (ref 275–295)
POTASSIUM SERPL-SCNC: 3.7 MMOL/L (ref 3.5–5.1)
PROT SERPL-MCNC: 7.8 G/DL (ref 6.4–8.2)
SODIUM SERPL-SCNC: 138 MMOL/L (ref 136–145)
T4 FREE SERPL-MCNC: 1 NG/DL (ref 0.8–1.7)
TRIGL SERPL-MCNC: 327 MG/DL (ref 30–149)
TSI SER-ACNC: 6 MIU/ML (ref 0.36–3.74)
VLDLC SERPL CALC-MCNC: 74 MG/DL (ref 0–30)

## 2024-04-03 PROCEDURE — 80053 COMPREHEN METABOLIC PANEL: CPT | Performed by: FAMILY MEDICINE

## 2024-04-03 PROCEDURE — 80061 LIPID PANEL: CPT | Performed by: FAMILY MEDICINE

## 2024-04-03 PROCEDURE — 83036 HEMOGLOBIN GLYCOSYLATED A1C: CPT | Performed by: FAMILY MEDICINE

## 2024-04-03 PROCEDURE — 84439 ASSAY OF FREE THYROXINE: CPT | Performed by: FAMILY MEDICINE

## 2024-04-03 PROCEDURE — 84443 ASSAY THYROID STIM HORMONE: CPT | Performed by: FAMILY MEDICINE

## 2024-04-04 DIAGNOSIS — R73.9 HYPERGLYCEMIA: ICD-10-CM

## 2024-04-04 DIAGNOSIS — E03.9 HYPOTHYROIDISM, UNSPECIFIED TYPE: Primary | ICD-10-CM

## 2024-04-04 DIAGNOSIS — Z13.220 LIPID SCREENING: ICD-10-CM

## 2024-04-04 DIAGNOSIS — E78.00 HYPERCHOLESTEROLEMIA: ICD-10-CM

## 2024-04-04 DIAGNOSIS — R73.09 ELEVATED GLUCOSE: ICD-10-CM

## 2024-05-09 ENCOUNTER — TELEPHONE (OUTPATIENT)
Dept: FAMILY MEDICINE CLINIC | Facility: CLINIC | Age: 64
End: 2024-05-09

## 2024-06-11 DIAGNOSIS — E03.9 HYPOTHYROIDISM, UNSPECIFIED TYPE: ICD-10-CM

## 2024-06-11 RX ORDER — LEVOTHYROXINE SODIUM 88 UG/1
88 TABLET ORAL
Qty: 90 TABLET | Refills: 0 | Status: SHIPPED | OUTPATIENT
Start: 2024-06-11

## 2024-06-11 NOTE — TELEPHONE ENCOUNTER
Last refill:  06/14/23  Qty: 90  W/ 3 refills  Last ov: 11/16/23    Requested Prescriptions     Pending Prescriptions Disp Refills    levothyroxine 88 MCG Oral Tab 90 tablet 3     Sig: Take 1 tablet (88 mcg total) by mouth before breakfast.     No future appointments.

## 2024-07-10 ENCOUNTER — OFFICE VISIT (OUTPATIENT)
Dept: FAMILY MEDICINE CLINIC | Facility: CLINIC | Age: 64
End: 2024-07-10
Payer: COMMERCIAL

## 2024-07-10 VITALS
OXYGEN SATURATION: 98 % | WEIGHT: 162 LBS | RESPIRATION RATE: 21 BRPM | SYSTOLIC BLOOD PRESSURE: 164 MMHG | DIASTOLIC BLOOD PRESSURE: 80 MMHG | BODY MASS INDEX: 25.43 KG/M2 | HEIGHT: 67 IN | TEMPERATURE: 99 F | HEART RATE: 78 BPM

## 2024-07-10 DIAGNOSIS — N90.4 LICHEN SCLEROSUS OF FEMALE GENITALIA: ICD-10-CM

## 2024-07-10 DIAGNOSIS — E03.9 HYPOTHYROIDISM, UNSPECIFIED TYPE: ICD-10-CM

## 2024-07-10 DIAGNOSIS — N95.2 VAGINAL ATROPHY: ICD-10-CM

## 2024-07-10 DIAGNOSIS — E78.00 HYPERCHOLESTEROLEMIA: ICD-10-CM

## 2024-07-10 DIAGNOSIS — Z13.21 ENCOUNTER FOR VITAMIN DEFICIENCY SCREENING: ICD-10-CM

## 2024-07-10 DIAGNOSIS — I10 PRIMARY HYPERTENSION: ICD-10-CM

## 2024-07-10 DIAGNOSIS — E55.9 VITAMIN D DEFICIENCY: ICD-10-CM

## 2024-07-10 DIAGNOSIS — Z00.00 WELLNESS EXAMINATION: Primary | ICD-10-CM

## 2024-07-10 DIAGNOSIS — R53.83 OTHER FATIGUE: ICD-10-CM

## 2024-07-10 PROCEDURE — 3077F SYST BP >= 140 MM HG: CPT

## 2024-07-10 PROCEDURE — 3008F BODY MASS INDEX DOCD: CPT

## 2024-07-10 PROCEDURE — 3079F DIAST BP 80-89 MM HG: CPT

## 2024-07-10 PROCEDURE — 99396 PREV VISIT EST AGE 40-64: CPT

## 2024-07-10 RX ORDER — ESTRADIOL 0.1 MG/G
2 CREAM VAGINAL
Qty: 42.5 G | Refills: 0 | Status: SHIPPED | OUTPATIENT
Start: 2024-07-10 | End: 2024-08-09

## 2024-07-10 NOTE — PROGRESS NOTES
HPI:   Monae Johnson is a 64 year old female who presents for an Annual Health Visit.     She's gets alternating mammogram and MRI every 6 months due to family history of breast cancer. She uses premarin cream with relief af vaginal dryness but cream is 400 dollars.    She did a cologuard last year that was negative.    Allergies:     Allergies   Allergen Reactions    Amoxicillin HIVES    Amoxicillin HIVES       CURRENT MEDICATIONS   Current Outpatient Medications   Medication Sig Dispense Refill    levothyroxine 88 MCG Oral Tab Take 1 tablet (88 mcg total) by mouth before breakfast. Due for office visit before next refill 90 tablet 0    lisinopril-hydroCHLOROthiazide 10-12.5 MG Oral Tab Take 1 tablet by mouth daily. 90 tablet 1    NON FORMULARY Liver Aid      Clobetasol Propionate 0.05 % External Cream Apply 1 Application topically once a week. Lichen Sclerosus  0    Magnesium 400 MG Oral Cap Take 3 capsules by mouth daily.      Cholecalciferol (VITAMIN D3) 5000 units Oral Cap Take 1 capsule by mouth daily. (Patient not taking: Reported on 7/10/2024) 1 capsule 0      HISTORICAL INFORMATION   Past Medical History:    Disorder of thyroid    Esophageal reflux    High blood pressure    High cholesterol    History of COVID-19    symptoms: HA, sinus congestion, chills; s/s resolved; not hospitalized    PONV (postoperative nausea and vomiting)    Unspecified essential hypertension    Visual impairment    glasses, contacts      Past Surgical History:   Procedure Laterality Date    Hernia surgery  2017    Hysterectomy      Other surgical history  2002    ovary removed - L    Tonsillectomy      Tubal ligation        Family History   Problem Relation Age of Onset    Breast Cancer Mother 53    Breast Cancer Sister 45    Ovarian Cancer Maternal Grandmother 78      SOCIAL HISTORY   Social History     Socioeconomic History    Marital status:    Tobacco Use    Smoking status: Never    Smokeless tobacco: Never   Vaping  Use    Vaping status: Never Used   Substance and Sexual Activity    Alcohol use: Yes     Alcohol/week: 7.0 - 14.0 standard drinks of alcohol     Types: 7 - 14 Standard drinks or equivalent per week    Drug use: No    Sexual activity: Yes     Partners: Female     Social Determinants of Health      Received from [facility], [facility]    Social Connections    Received from [facility], [facility]    Housing Stability     Social History     Social History Narrative    Not on file        REVIEW OF SYSTEMS:     Review of Systems   Constitutional:  Negative for activity change, appetite change and fatigue.   HENT:  Negative for congestion, hearing loss and sore throat.    Eyes:  Negative for discharge and redness.   Respiratory:  Negative for shortness of breath and wheezing.    Cardiovascular:  Negative for chest pain.   Gastrointestinal:  Negative for abdominal distention and abdominal pain.   Endocrine: Negative for cold intolerance and heat intolerance.   Genitourinary:  Negative for difficulty urinating and urgency.   Musculoskeletal:  Negative for arthralgias and back pain.   Skin:  Negative for color change.   Allergic/Immunologic: Negative for environmental allergies.   Neurological:  Negative for dizziness, syncope and headaches.   Hematological:  Negative for adenopathy. Does not bruise/bleed easily.   Psychiatric/Behavioral:  Negative for agitation, behavioral problems and confusion.          EXAM:   BP (!) 164/80   Pulse 78   Temp 98.8 °F (37.1 °C) (Temporal)   Resp 21   Ht 5' 7\" (1.702 m)   Wt 162 lb (73.5 kg)   SpO2 98%   BMI 25.37 kg/m²    Wt Readings from Last 6 Encounters:   07/10/24 162 lb (73.5 kg)   11/16/23 161 lb (73 kg)   07/06/23 161 lb (73 kg)   10/31/22 163 lb (73.9 kg)   09/13/22 165 lb (74.8 kg)   05/31/22 170 lb (77.1 kg)     Body mass index is 25.37 kg/m².    Physical Exam  Constitutional:       Appearance:  Normal appearance.   HENT:      Head: Normocephalic and atraumatic.      Right Ear: Tympanic membrane normal.      Left Ear: Tympanic membrane normal.      Nose: Nose normal.      Mouth/Throat:      Mouth: Mucous membranes are moist.      Pharynx: Oropharynx is clear.   Eyes:      Extraocular Movements: Extraocular movements intact.      Conjunctiva/sclera: Conjunctivae normal.      Pupils: Pupils are equal, round, and reactive to light.   Cardiovascular:      Rate and Rhythm: Normal rate and regular rhythm.      Pulses: Normal pulses.      Heart sounds: Normal heart sounds.   Pulmonary:      Effort: Pulmonary effort is normal.      Breath sounds: Normal breath sounds.   Abdominal:      General: Bowel sounds are normal.      Palpations: Abdomen is soft.   Musculoskeletal:         General: Normal range of motion.      Cervical back: Normal range of motion.   Skin:     General: Skin is warm and dry.      Capillary Refill: Capillary refill takes less than 2 seconds.   Neurological:      Mental Status: She is alert and oriented to person, place, and time.   Psychiatric:         Mood and Affect: Mood normal.         Behavior: Behavior normal.          ASSESSMENT AND PLAN:   Monae was seen today for physical.    Diagnoses and all orders for this visit:    Wellness examination  Comments:  Labs done in May  Orders:  -     Vitamin D, 25-Hydroxy; Future  -     Vitamin B12; Future    Encounter for vitamin deficiency screening  -     Vitamin D, 25-Hydroxy; Future  -     Vitamin B12; Future    Vaginal atrophy  -     estradiol 0.1 MG/GM Vaginal Cream; Place 2 g vaginally twice a week.    Primary hypertension  Comments:  Takes lisinopril hydrochlorothiazide daily    Hypercholesterolemia  Comments:  Continuing to work on natural methods of lowering cholesterol.  Declines statin at this time.    Lichen sclerosus of female genitalia  -     estradiol 0.1 MG/GM Vaginal Cream; Place 2 g vaginally twice a week.    Hypothyroidism,  unspecified type  Comments:  Takes 88 mcg of levothyroxine daily    Other fatigue  -     Vitamin D, 25-Hydroxy; Future  -     Vitamin B12; Future    Vitamin D deficiency      The patient indicates understanding of these issues and agrees to the plan.      Problem List:  Patient Active Problem List   Diagnosis    Hypothyroid    Hypertension    Sebaceous cyst,scalp    Neck pain    Thoracic back pain    Hair loss    Hypercholesterolemia    Lichen sclerosus of female genitalia    Acute left-sided low back pain without sciatica       KYAW Hill  7/10/2024  9:38 AM

## 2024-07-15 ENCOUNTER — LABORATORY ENCOUNTER (OUTPATIENT)
Dept: LAB | Age: 64
End: 2024-07-15
Payer: COMMERCIAL

## 2024-07-15 DIAGNOSIS — R53.83 OTHER FATIGUE: ICD-10-CM

## 2024-07-15 DIAGNOSIS — Z00.00 WELLNESS EXAMINATION: ICD-10-CM

## 2024-07-15 DIAGNOSIS — Z13.21 ENCOUNTER FOR VITAMIN DEFICIENCY SCREENING: ICD-10-CM

## 2024-07-15 LAB
VIT B12 SERPL-MCNC: 279 PG/ML (ref 193–986)
VIT D+METAB SERPL-MCNC: 87.2 NG/ML (ref 30–100)

## 2024-07-15 PROCEDURE — 82306 VITAMIN D 25 HYDROXY: CPT

## 2024-07-15 PROCEDURE — 82607 VITAMIN B-12: CPT

## 2024-09-03 ENCOUNTER — TELEPHONE (OUTPATIENT)
Dept: FAMILY MEDICINE CLINIC | Facility: CLINIC | Age: 64
End: 2024-09-03

## 2024-09-03 DIAGNOSIS — E78.00 HYPERCHOLESTEROLEMIA: Primary | ICD-10-CM

## 2024-09-03 DIAGNOSIS — E03.9 HYPOTHYROIDISM, UNSPECIFIED TYPE: ICD-10-CM

## 2024-09-03 DIAGNOSIS — R73.9 HYPERGLYCEMIA: ICD-10-CM

## 2024-09-03 DIAGNOSIS — I10 ESSENTIAL HYPERTENSION: ICD-10-CM

## 2024-09-03 RX ORDER — LISINOPRIL/HYDROCHLOROTHIAZIDE 10-12.5 MG
1 TABLET ORAL DAILY
Qty: 90 TABLET | Refills: 1 | Status: SHIPPED | OUTPATIENT
Start: 2024-09-03 | End: 2024-09-09

## 2024-09-03 RX ORDER — LEVOTHYROXINE SODIUM 88 UG/1
88 TABLET ORAL
Qty: 90 TABLET | Refills: 3 | Status: SHIPPED | OUTPATIENT
Start: 2024-09-03

## 2024-09-03 NOTE — TELEPHONE ENCOUNTER
Last seen 7/10/2024    Fasting labs due around 10/4/2024; do you want to add TSH with free T4?   She isn't taking supplements.      Meds refilled

## 2024-09-09 DIAGNOSIS — I10 ESSENTIAL HYPERTENSION: ICD-10-CM

## 2024-09-09 DIAGNOSIS — E03.9 HYPOTHYROIDISM, UNSPECIFIED TYPE: ICD-10-CM

## 2024-09-09 RX ORDER — LEVOTHYROXINE SODIUM 88 UG/1
88 TABLET ORAL
Qty: 90 TABLET | Refills: 3 | OUTPATIENT
Start: 2024-09-09

## 2024-09-09 RX ORDER — LISINOPRIL/HYDROCHLOROTHIAZIDE 10-12.5 MG
1 TABLET ORAL DAILY
Qty: 30 TABLET | Refills: 0 | Status: SHIPPED | OUTPATIENT
Start: 2024-09-09

## 2024-09-09 NOTE — TELEPHONE ENCOUNTER
lisinopril-hydroCHLOROthiazide 10-12.5 MG Oral Tab     Hypertension Medications Protocol [id number]/09/2024 02:31 PM   Protocol Details Last BP reading less than 140/90    CMP or BMP in past 12 months    In person appointment or virtual visit in the past 12 mos or appointment in next 3 mos    EGFRCR or GFRNAA > 50      Last office visit:  7/10/24  No future appointments.  Last filled:  9/3/24  #90 with 1 refill   Last labs:  Due for labs October 2024  Last BP:  164/80

## 2024-09-09 NOTE — TELEPHONE ENCOUNTER
Last refill: 09/03/24  qtY: 90  W/  3 refills  Last ov: 11/16/23    Requested Prescriptions     Pending Prescriptions Disp Refills    levothyroxine 88 MCG Oral Tab 90 tablet 3     Sig: Take 1 tablet (88 mcg total) by mouth before breakfast. Due for office visit before next refill     No future appointments.

## 2024-11-19 ENCOUNTER — TELEPHONE (OUTPATIENT)
Dept: FAMILY MEDICINE CLINIC | Facility: CLINIC | Age: 64
End: 2024-11-19

## 2024-11-19 NOTE — TELEPHONE ENCOUNTER
Monae requested letter for jury duty be faxed to [facility] [employer] commission. Faxed to [phone].

## 2024-11-19 NOTE — TELEPHONE ENCOUNTER
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

## 2024-12-10 ENCOUNTER — OFFICE VISIT (OUTPATIENT)
Dept: FAMILY MEDICINE CLINIC | Facility: CLINIC | Age: 64
End: 2024-12-10
Payer: COMMERCIAL

## 2024-12-10 VITALS
HEART RATE: 98 BPM | BODY MASS INDEX: 24.33 KG/M2 | TEMPERATURE: 99 F | WEIGHT: 155 LBS | OXYGEN SATURATION: 99 % | RESPIRATION RATE: 18 BRPM | HEIGHT: 67 IN | SYSTOLIC BLOOD PRESSURE: 136 MMHG | DIASTOLIC BLOOD PRESSURE: 80 MMHG

## 2024-12-10 DIAGNOSIS — J01.00 ACUTE NON-RECURRENT MAXILLARY SINUSITIS: Primary | ICD-10-CM

## 2024-12-10 DIAGNOSIS — I10 ESSENTIAL HYPERTENSION: ICD-10-CM

## 2024-12-10 PROCEDURE — 3075F SYST BP GE 130 - 139MM HG: CPT | Performed by: FAMILY MEDICINE

## 2024-12-10 PROCEDURE — 3008F BODY MASS INDEX DOCD: CPT | Performed by: FAMILY MEDICINE

## 2024-12-10 PROCEDURE — 3079F DIAST BP 80-89 MM HG: CPT | Performed by: FAMILY MEDICINE

## 2024-12-10 PROCEDURE — 99214 OFFICE O/P EST MOD 30 MIN: CPT | Performed by: FAMILY MEDICINE

## 2024-12-10 RX ORDER — PREDNISONE 20 MG/1
40 TABLET ORAL DAILY
Qty: 10 TABLET | Refills: 0 | Status: SHIPPED | OUTPATIENT
Start: 2024-12-10 | End: 2024-12-15

## 2024-12-10 RX ORDER — DOXYCYCLINE 100 MG/1
100 CAPSULE ORAL 2 TIMES DAILY
Qty: 20 CAPSULE | Refills: 0 | Status: SHIPPED | OUTPATIENT
Start: 2024-12-10

## 2024-12-10 NOTE — PROGRESS NOTES
Subjective:   Patient ID: Monae Johnson is a 64 year old female.    Sinus Problem  Associated symptoms include congestion, coughing and sinus pressure. Pertinent negatives include no shortness of breath or sore throat.   X 10 days  R sinus  + sharmin  + cough  W/o fever    History/Other:   Review of Systems   Constitutional:  Positive for fatigue.   HENT:  Positive for congestion, postnasal drip, sinus pressure and sinus pain. Negative for sore throat.    Respiratory:  Positive for cough. Negative for shortness of breath.    Gastrointestinal: Negative.    Genitourinary: Negative.      Current Outpatient Medications   Medication Sig Dispense Refill    Doxycycline Monohydrate 100 MG Oral Cap Take 1 capsule (100 mg total) by mouth 2 (two) times daily. 20 capsule 0    predniSONE 20 MG Oral Tab Take 2 tablets (40 mg total) by mouth daily for 5 days. 10 tablet 0    lisinopril-hydroCHLOROthiazide 10-12.5 MG Oral Tab Take 1 tablet by mouth daily. 30 tablet 0    levothyroxine 88 MCG Oral Tab Take 1 tablet (88 mcg total) by mouth before breakfast. Due for office visit before next refill 90 tablet 3    NON FORMULARY Liver Aid      Clobetasol Propionate 0.05 % External Cream Apply 1 Application topically once a week. Lichen Sclerosus  0    Magnesium 400 MG Oral Cap Take 3 capsules by mouth daily.      Cholecalciferol (VITAMIN D3) 5000 units Oral Cap Take 1 capsule by mouth daily. (Patient not taking: Reported on 12/10/2024) 1 capsule 0     Allergies:Allergies[1]    Objective:   Physical Exam  Vitals reviewed.   Constitutional:       Appearance: Normal appearance.   Cardiovascular:      Rate and Rhythm: Normal rate and regular rhythm.      Pulses: Normal pulses.      Heart sounds: Normal heart sounds.   Pulmonary:      Effort: Pulmonary effort is normal.      Breath sounds: Normal breath sounds.   Abdominal:      General: Bowel sounds are normal.      Palpations: Abdomen is soft.   Musculoskeletal:      Cervical back: Normal range  of motion and neck supple. No tenderness.      Right lower leg: No edema.      Left lower leg: No edema.   Neurological:      Mental Status: She is alert and oriented to person, place, and time.       /80   Pulse 98   Temp 98.6 °F (37 °C) (Temporal)   Resp 18   Ht 5' 7\" (1.702 m)   Wt 155 lb (70.3 kg)   SpO2 99%   BMI 24.28 kg/m²     Assessment & Plan:   1. Acute non-recurrent maxillary sinusitis    2. Essential hypertension        No orders of the defined types were placed in this encounter.      Meds This Visit:  Requested Prescriptions     Signed Prescriptions Disp Refills    Doxycycline Monohydrate 100 MG Oral Cap 20 capsule 0     Sig: Take 1 capsule (100 mg total) by mouth 2 (two) times daily.    predniSONE 20 MG Oral Tab 10 tablet 0     Sig: Take 2 tablets (40 mg total) by mouth daily for 5 days.       Imaging & Referrals:  None         [1]   Allergies  Allergen Reactions    Amoxicillin HIVES    Amoxicillin HIVES

## 2025-02-27 DIAGNOSIS — I10 ESSENTIAL HYPERTENSION: ICD-10-CM

## 2025-02-27 RX ORDER — LISINOPRIL AND HYDROCHLOROTHIAZIDE 10; 12.5 MG/1; MG/1
1 TABLET ORAL DAILY
Qty: 90 TABLET | Refills: 3 | Status: SHIPPED | OUTPATIENT
Start: 2025-02-27

## 2025-02-27 NOTE — TELEPHONE ENCOUNTER
Last refill; 09/09/24  Qty: 30  W/ 0 refills  Last ov: 12/10/24    Requested Prescriptions     Pending Prescriptions Disp Refills    LISINOPRIL-HYDROCHLOROTHIAZIDE 10-12.5 MG Oral Tab [Pharmacy Med Name: LISINOPRIL-HCTZ 10-12.5 MG TAB] 90 tablet 1     Sig: TAKE 1 TABLET BY MOUTH EVERY DAY     No future appointments.  BP Readings from Last 3 Encounters:   12/10/24 136/80   07/10/24 (!) 164/80   11/16/23 134/80

## 2025-04-28 ENCOUNTER — PATIENT MESSAGE (OUTPATIENT)
Dept: FAMILY MEDICINE CLINIC | Facility: CLINIC | Age: 65
End: 2025-04-28

## 2025-04-28 ENCOUNTER — TELEPHONE (OUTPATIENT)
Dept: FAMILY MEDICINE CLINIC | Facility: CLINIC | Age: 65
End: 2025-04-28

## 2025-04-28 NOTE — TELEPHONE ENCOUNTER
See previous phone note, sending patient mychart of [facility] office information so they can pick a provider

## 2025-04-29 NOTE — TELEPHONE ENCOUNTER
Patient is aware of PCP retiring. She is unsure which provider she will establish with - either Ras or Alli.     Patient will call this office back with decision.

## 2025-05-09 ENCOUNTER — OFFICE VISIT (OUTPATIENT)
Dept: FAMILY MEDICINE CLINIC | Facility: CLINIC | Age: 65
End: 2025-05-09
Payer: MEDICARE

## 2025-05-09 VITALS
TEMPERATURE: 98 F | BODY MASS INDEX: 25.23 KG/M2 | WEIGHT: 157 LBS | SYSTOLIC BLOOD PRESSURE: 138 MMHG | HEIGHT: 66 IN | RESPIRATION RATE: 18 BRPM | HEART RATE: 73 BPM | DIASTOLIC BLOOD PRESSURE: 78 MMHG | OXYGEN SATURATION: 99 %

## 2025-05-09 DIAGNOSIS — R09.81 SINUS CONGESTION: Primary | ICD-10-CM

## 2025-05-09 PROCEDURE — 99213 OFFICE O/P EST LOW 20 MIN: CPT | Performed by: PHYSICIAN ASSISTANT

## 2025-05-09 RX ORDER — BENZONATATE 200 MG/1
200 CAPSULE ORAL 3 TIMES DAILY PRN
Qty: 30 CAPSULE | Refills: 0 | Status: SHIPPED | OUTPATIENT
Start: 2025-05-09

## 2025-05-09 NOTE — PROGRESS NOTES
CHIEF COMPLAINT:     Chief Complaint   Patient presents with    Allergies     Entered by patient  Started 2 days ago   No fevers        HPI:   Monae Johnson is a 65 year old female who presents with pressure in the mid face more on the right.  Has notes her upper teeth hurt on the right. Some runny nose, mild cough. Symptoms for 2-3 days.   No chronic sinusitis or sinus surgery.       Associated symptoms:    Fever/Chills  No  Sore throat  Yes mild irritation not so much pain.   Cough  Yes   Congestion Yes  Bodyache  No  Headache  Yes  Chest pain No  SOB/Dyspnea No  Loss of taste No  Loss of smell No  Diarrhea No  Vomiting No    No flu shot  No covid vaccinations    Current Medications[1]   Past Medical History[2]   Social History:  Short Social Hx on File[3]     Review of Systems:    Positive for stated complaint: sinus.   Pertinent positives and negatives noted in the the HPI.    EXAM:   /78   Pulse 73   Temp 97.7 °F (36.5 °C)   Resp 18   Ht 5' 6\" (1.676 m)   Wt 157 lb (71.2 kg)   SpO2 99%   BMI 25.34 kg/m²   GENERAL: well developed, well nourished,in no apparent distress  SKIN: no rashes,no suspicious lesions  HEAD: atraumatic, normocephalic  EYES: conjunctiva clear, sclera white,  PERRLA  EARS: TM's non erythematous  NOSE: nares patent, mucosa mild congestion  THROAT: Posterior pharynx is non erythematous, tonsils surgically absent. No exudate.  Minimal pnd  NECK: supple, non-tender  LUNGS: clear to auscultation bilaterally without rale, ronchi, wheeze.  CARDIO: S1/S2 without murmur  EXTREMITIES: no cyanosis, clubbing or edema  LYMPH:  no gross lymphadenopathy.      No results found for this or any previous visit (from the past 24 hours).      ASSESSMENT AND PLAN:   Monae Johnson is a 65 year old female who presents with Allergies (Entered by patient/Started 2 days ago /No fevers ). Symptoms are consistent with:      ASSESSMENT:  Encounter Diagnosis   Name Primary?    Sinus congestion Yes      Discussed potentially allergy vs viral related. Doubt bacterial sinusitis.      PLAN: viral testing declined      Symptomatic care:   1. Rest. Drink plenty of fluids.  2. Tylenol or ibuprofen for discomfort or fever.   3. OTC decongestant (phenylephrine) expectorants (guaifenesin), nasal steroid sprays  (fluticasone) may be helpful        for congestion.  4. OTC cough suppressant for cough  (dextromethorphan)  5. Chloraseptic spray/throat lozenges for sore throat   6 tessalon as written     Go to the ED for evaluation with progressive symptoms of difficulty swallowing, breathing, shortness of breath, chest pain, extreme weakness, or confusion.         Meds & Refills for this Visit:  Requested Prescriptions     Signed Prescriptions Disp Refills    benzonatate 200 MG Oral Cap 30 capsule 0     Sig: Take 1 capsule (200 mg total) by mouth 3 (three) times daily as needed for cough.       Risks, benefits, side effects of medication addressed and explained.    There are no Patient Instructions on file for this visit.    The patient indicates understanding of these issues and agrees to the plan.  The patient is asked to follow up PCP           [1]   Current Outpatient Medications   Medication Sig Dispense Refill    benzonatate 200 MG Oral Cap Take 1 capsule (200 mg total) by mouth 3 (three) times daily as needed for cough. 30 capsule 0    lisinopril-hydroCHLOROthiazide 10-12.5 MG Oral Tab TAKE 1 TABLET BY MOUTH EVERY DAY 90 tablet 3    levothyroxine 88 MCG Oral Tab Take 1 tablet (88 mcg total) by mouth before breakfast. Due for office visit before next refill 90 tablet 3    NON FORMULARY Liver Aid      Clobetasol Propionate 0.05 % External Cream Apply 1 Application topically once a week. Lichen Sclerosus  0    Magnesium 400 MG Oral Cap Take 3 capsules by mouth daily.      Cholecalciferol (VITAMIN D3) 5000 units Oral Cap Take 1 capsule by mouth daily. 1 capsule 0   [2]   Past Medical History:   Disorder of thyroid     Esophageal reflux    High blood pressure    High cholesterol    History of COVID-19    symptoms: HA, sinus congestion, chills; s/s resolved; not hospitalized    PONV (postoperative nausea and vomiting)    Unspecified essential hypertension    Visual impairment    glasses, contacts   [3]   Social History  Socioeconomic History    Marital status:    Tobacco Use    Smoking status: Never    Smokeless tobacco: Never   Vaping Use    Vaping status: Never Used   Substance and Sexual Activity    Alcohol use: Yes     Alcohol/week: 7.0 - 14.0 standard drinks of alcohol     Types: 7 - 14 Standard drinks or equivalent per week    Drug use: No    Sexual activity: Yes     Partners: Female     Social Drivers of Health      Received from [facility]    Housing Stability

## (undated) DIAGNOSIS — I10 ESSENTIAL HYPERTENSION: ICD-10-CM

## (undated) DIAGNOSIS — R10.9 RIGHT-SIDED ABDOMINAL PAIN OF UNKNOWN ETIOLOGY: Primary | ICD-10-CM

## (undated) DEVICE — GENERAL LAPAROS CDS-LF: Brand: MEDLINE INDUSTRIES, INC.

## (undated) DEVICE — LIGHT HANDLE

## (undated) DEVICE — PENCIL TELESCOPE MEGADYNE SE

## (undated) DEVICE — TROCAR: Brand: KII SHIELDED BLADED ACCESS SYSTEM

## (undated) DEVICE — PLUMEPORT ACTIV LAPAROSCOPIC SMOKE FILTRATION DEVICE: Brand: PLUMEPORT ACTIVE

## (undated) DEVICE — TROCARS: Brand: KII® BALLOON BLUNT TIP SYSTEM

## (undated) DEVICE — SUT SILK 2-0 SH K833H

## (undated) DEVICE — HARMONIC 1100 SHEARS, 36CM SHAFT LENGTH: Brand: HARMONIC

## (undated) DEVICE — VIOLET BRAIDED (POLYGLACTIN 910), SYNTHETIC ABSORBABLE SUTURE: Brand: COATED VICRYL

## (undated) DEVICE — APPLICATOR CHLORAPREP 26ML

## (undated) DEVICE — SUT MONOCRYL 4-0 PS-2 Y496G

## (undated) DEVICE — #15 STERILE STAINLESS BLADE: Brand: STERILE STAINLESS BLADES

## (undated) DEVICE — CAPSURE PERMANENT FIXATION SYSTEM 30 PERMANENT FASTENERS: Brand: CAPSURE PERMANENT FIXATION SYSTEM

## (undated) DEVICE — TRAY SURESTEP 16 BARDEX UMETR

## (undated) DEVICE — 40580 - THE PINK PAD - ADVANCED TRENDELENBURG POSITIONING KIT: Brand: 40580 - THE PINK PAD - ADVANCED TRENDELENBURG POSITIONING KIT

## (undated) DEVICE — SLEEVE KENDALL SCD EXPRESS MED

## (undated) DEVICE — TROCAR: Brand: KII® SLEEVE

## (undated) NOTE — MR AVS SNAPSHOT
[facility]  [address] [address]  [phone]               Thank you for choosing us for your health care visit with Arun Chaney DO.   We are glad to serve you and happy to provide you with this summary of [employer] for assistance with your inactive [employer] account    If you have questions, you can call [phone] to talk to our [facility] Staff. Remember, [employer] is NOT to be used for urgent needs. For medical emergencies, dial 911.     Vi

## (undated) NOTE — Clinical Note
Date: 2/14/2017    Patient Name: Padmini Wade          To Whom it may concern:     This letter has been written at the patient's request. Bobby Ayers may return to work on 2/27/17 with the following restrictions, No lifting, pulling or pushing greater than 5 lb

## (undated) NOTE — Clinical Note
I saw Vlad Mendoza in the office, she presents with right groin discomfort. There is no hernia on examination. We lengthy discussion regarding the possibility of early hernia formation at the internal ring. Patient is considering proceeding with a CT scan of the pelvis which would delineate this potential etiology for her pain.   Thank you Laurie Santamaria

## (undated) NOTE — MR AVS SNAPSHOT
Inocente Mcdonough  [address] [facility] [address]  [phone]               Thank you for choosing us for your health care visit with Nemo Viera DO.   We are glad to serve you and happy to provide you with this summary of directed              Follow-up Instructions     Return in about 3 months (around 4/9/2017), or if symptoms worsen or fail to improve.          Referral Information     Referral Order Referred to Address [facility] Phone Visits Status Diagnosis           [place]

## (undated) NOTE — LETTER
Patient Name: Song Yang CSN: [id number]  -Age / Sex: 1960-A: 58 y  female Medical Records: [id number]    The above patient had a positive COVID test on: __2022___      Per [facility] Infection Control guidelines this patient will NOT be retested for COVID  prior to their surgery/procedure on: ___10/31/2022_____. Thank you.

## (undated) NOTE — MR AVS SNAPSHOT
[facility]  [address] [facility] [address]  [phone]               Thank you for choosing us for your health care visit with Coley Eisenmenger, DO.   We are glad to serve you and happy to provide you with this summary Phone:  [phone] - [employer] [address]     Call the [employer] for assistance with your inactive [employer] account    If you have questions, you can call [phone] to talk to our [facility] Staff.  Remember, [employer] is NOT

## (undated) NOTE — LETTER
ASTHMA ACTION PLAN for Sanchez Situ     : 1960     Date: 10/17/2017  Provider:  Haily Nunez DO  Phone for doctor or clinic: [address] [place] [address], [address] [place] [id number] [phone]    [facility]

## (undated) NOTE — LETTER
03/04/21        [address] [facility]  [place] [address]      Dear Abram Alfaro,    Our records indicate that you have outstanding lab work and or testing that was ordered for you and has not yet been completed:  Orders Placed This Encounter      Lip

## (undated) NOTE — Clinical Note
Javon Wiseman saw Aiden Avila in the office today. She does have right upper quadrant postprandial abdominal pain with associated sludge on her gallbladder imaging. I am recommending laparoscopic cholecystectomy.   I have  discussed the procedure and postoperative r

## (undated) NOTE — LETTER
OUTSIDE TESTING RESULT REQUEST     IMPORTANT: FOR YOUR IMMEDIATE ATTENTION  Please FAX all test results listed below to: [phone]     Testing already done on or about: 2022     * * * * If testing is NOT complete, arrange with patient A.S.A.P. * * * *      Patient Name: Robby Naylor  Surgery Date: 2022  CSN: [id number]  Medical Record: [id number]   : 1960 - A: 58 y      Sex: female  Surgeon(s):  Juwan Mendez DO  Procedure: RIGHT LAPAROSCOPIC INGUINAL HERNIA REPAIR WITH MESH  Anesthesia Type: General     Surgeon: Juwan Mendez DO     The following Testing and Time Line are REQUIRED PER ANESTHESIA     EKG READ AND SIGNED WITHIN   90 days      Thank You,   Sent by: Cristhian Caldwell

## (undated) NOTE — LETTER
2024              Monae Johnson        [address]        [facility] [address]         To whom it may concern,    Monae Johnson, -1960, is currently a patient under my medical care.  The patient's  medical condition makes wife serving on jury duty inadvisable at this time.  Please excuse her from serving.  If you require additional information please do not hesitate to contact my office.      Sincerely,      Agusto Simmons, DO  [facility], [facility]  [address] [facility] [id number] [phone]

## (undated) NOTE — MR AVS SNAPSHOT
[facility]  [address] [facility] [address] [phone]               Thank you for choosing us for your health care visit with Bailey Lopez DO.   We are glad to serve you and happy to provide you with this summary of TAKE 1 TABLET BY MOUTH DAILY           * Lisinopril-Hydrochlorothiazide 10-12.5 MG Tabs   TAKE 1 TABLET BY MOUTH DAILY           predniSONE 20 MG Tabs   Take 1 tablet (20 mg total) by mouth 2 (two) times daily.    Commonly known as:  Alondra Valdovinos Make half your plate fruits and vegetables Highly refined, white starches including white bread, rice and pasta   Eat plenty of protein, keep the fat content low Sugars:  sodas and sports drinks, candies and desserts   Eat plenty of low-fat dairy products